# Patient Record
Sex: MALE | Race: WHITE | NOT HISPANIC OR LATINO | Employment: FULL TIME | URBAN - METROPOLITAN AREA
[De-identification: names, ages, dates, MRNs, and addresses within clinical notes are randomized per-mention and may not be internally consistent; named-entity substitution may affect disease eponyms.]

---

## 2017-01-04 ENCOUNTER — HOSPITAL ENCOUNTER (EMERGENCY)
Facility: CLINIC | Age: 47
Discharge: HOME | End: 2017-01-04
Attending: EMERGENCY MEDICINE | Admitting: EMERGENCY MEDICINE
Payer: COMMERCIAL

## 2017-01-04 VITALS
HEIGHT: 72 IN | RESPIRATION RATE: 16 BRPM | TEMPERATURE: 98.6 F | BODY MASS INDEX: 32.78 KG/M2 | WEIGHT: 242 LBS | OXYGEN SATURATION: 97 % | DIASTOLIC BLOOD PRESSURE: 78 MMHG | SYSTOLIC BLOOD PRESSURE: 132 MMHG | HEART RATE: 82 BPM

## 2017-01-04 DIAGNOSIS — N20.0 KIDNEY STONE ON LEFT SIDE: ICD-10-CM

## 2017-01-04 LAB
ALBUMIN UR-MCNC: NEGATIVE MG/DL
ANION GAP SERPL CALCULATED.3IONS-SCNC: 9 MMOL/L (ref 3–14)
APPEARANCE UR: CLEAR
BASOPHILS # BLD AUTO: 0 10E9/L (ref 0–0.2)
BASOPHILS NFR BLD AUTO: 0.5 %
BILIRUB UR QL STRIP: NEGATIVE
BUN SERPL-MCNC: 16 MG/DL (ref 7–30)
CALCIUM SERPL-MCNC: 9.3 MG/DL (ref 8.5–10.1)
CHLORIDE SERPL-SCNC: 101 MMOL/L (ref 94–109)
CO2 SERPL-SCNC: 28 MMOL/L (ref 20–32)
COLOR UR AUTO: YELLOW
CREAT SERPL-MCNC: 1.19 MG/DL (ref 0.66–1.25)
DIFFERENTIAL METHOD BLD: NORMAL
EOSINOPHIL # BLD AUTO: 0.1 10E9/L (ref 0–0.7)
EOSINOPHIL NFR BLD AUTO: 1.2 %
ERYTHROCYTE [DISTWIDTH] IN BLOOD BY AUTOMATED COUNT: 12.1 % (ref 10–15)
GFR SERPL CREATININE-BSD FRML MDRD: 65 ML/MIN/1.7M2
GLUCOSE SERPL-MCNC: 108 MG/DL (ref 70–99)
GLUCOSE UR STRIP-MCNC: NEGATIVE MG/DL
HCT VFR BLD AUTO: 40.6 % (ref 40–53)
HGB BLD-MCNC: 14.3 G/DL (ref 13.3–17.7)
HGB UR QL STRIP: NEGATIVE
IMM GRANULOCYTES # BLD: 0 10E9/L (ref 0–0.4)
IMM GRANULOCYTES NFR BLD: 0.5 %
KETONES UR STRIP-MCNC: NEGATIVE MG/DL
LEUKOCYTE ESTERASE UR QL STRIP: NEGATIVE
LYMPHOCYTES # BLD AUTO: 2 10E9/L (ref 0.8–5.3)
LYMPHOCYTES NFR BLD AUTO: 23.4 %
MCH RBC QN AUTO: 30.3 PG (ref 26.5–33)
MCHC RBC AUTO-ENTMCNC: 35.2 G/DL (ref 31.5–36.5)
MCV RBC AUTO: 86 FL (ref 78–100)
MONOCYTES # BLD AUTO: 0.7 10E9/L (ref 0–1.3)
MONOCYTES NFR BLD AUTO: 8.1 %
MUCOUS THREADS #/AREA URNS LPF: PRESENT /LPF
NEUTROPHILS # BLD AUTO: 5.8 10E9/L (ref 1.6–8.3)
NEUTROPHILS NFR BLD AUTO: 66.3 %
NITRATE UR QL: NEGATIVE
NRBC # BLD AUTO: 0 10*3/UL
NRBC BLD AUTO-RTO: 0 /100
PH UR STRIP: 5.5 PH (ref 5–7)
PLATELET # BLD AUTO: 168 10E9/L (ref 150–450)
POTASSIUM SERPL-SCNC: 4 MMOL/L (ref 3.4–5.3)
RBC # BLD AUTO: 4.72 10E12/L (ref 4.4–5.9)
RBC #/AREA URNS AUTO: 9 /HPF (ref 0–2)
SODIUM SERPL-SCNC: 138 MMOL/L (ref 133–144)
SP GR UR STRIP: 1.01 (ref 1–1.03)
TRANS CELLS #/AREA URNS HPF: <1 /HPF (ref 0–1)
URN SPEC COLLECT METH UR: ABNORMAL
UROBILINOGEN UR STRIP-MCNC: NORMAL MG/DL (ref 0–2)
WBC # BLD AUTO: 8.7 10E9/L (ref 4–11)
WBC #/AREA URNS AUTO: 2 /HPF (ref 0–2)

## 2017-01-04 PROCEDURE — 99283 EMERGENCY DEPT VISIT LOW MDM: CPT

## 2017-01-04 PROCEDURE — 36415 COLL VENOUS BLD VENIPUNCTURE: CPT | Performed by: EMERGENCY MEDICINE

## 2017-01-04 PROCEDURE — 80048 BASIC METABOLIC PNL TOTAL CA: CPT | Performed by: EMERGENCY MEDICINE

## 2017-01-04 PROCEDURE — 85025 COMPLETE CBC W/AUTO DIFF WBC: CPT | Performed by: EMERGENCY MEDICINE

## 2017-01-04 PROCEDURE — 25000132 ZZH RX MED GY IP 250 OP 250 PS 637: Performed by: EMERGENCY MEDICINE

## 2017-01-04 PROCEDURE — 81001 URINALYSIS AUTO W/SCOPE: CPT | Performed by: EMERGENCY MEDICINE

## 2017-01-04 RX ORDER — ONDANSETRON 4 MG/1
4 TABLET, ORALLY DISINTEGRATING ORAL EVERY 8 HOURS PRN
Qty: 10 TABLET | Refills: 0 | Status: SHIPPED | OUTPATIENT
Start: 2017-01-04 | End: 2017-01-07

## 2017-01-04 RX ORDER — OXYCODONE AND ACETAMINOPHEN 5; 325 MG/1; MG/1
1 TABLET ORAL ONCE
Status: COMPLETED | OUTPATIENT
Start: 2017-01-04 | End: 2017-01-04

## 2017-01-04 RX ORDER — TAMSULOSIN HYDROCHLORIDE 0.4 MG/1
0.4 CAPSULE ORAL DAILY
Qty: 7 CAPSULE | Refills: 0 | Status: SHIPPED | OUTPATIENT
Start: 2017-01-04 | End: 2017-01-11

## 2017-01-04 RX ORDER — OXYCODONE AND ACETAMINOPHEN 5; 325 MG/1; MG/1
2 TABLET ORAL ONCE
Status: COMPLETED | OUTPATIENT
Start: 2017-01-04 | End: 2017-01-04

## 2017-01-04 RX ORDER — OXYCODONE AND ACETAMINOPHEN 5; 325 MG/1; MG/1
1 TABLET ORAL EVERY 6 HOURS PRN
Qty: 15 TABLET | Refills: 0 | Status: SHIPPED | OUTPATIENT
Start: 2017-01-04 | End: 2017-09-08

## 2017-01-04 RX ORDER — HYDROCODONE BITARTRATE AND ACETAMINOPHEN 5; 325 MG/1; MG/1
2 TABLET ORAL ONCE
Status: DISCONTINUED | OUTPATIENT
Start: 2017-01-04 | End: 2017-01-04

## 2017-01-04 RX ORDER — TAMSULOSIN HYDROCHLORIDE 0.4 MG/1
0.4 CAPSULE ORAL ONCE
Status: COMPLETED | OUTPATIENT
Start: 2017-01-04 | End: 2017-01-04

## 2017-01-04 RX ADMIN — OXYCODONE HYDROCHLORIDE AND ACETAMINOPHEN 1 TABLET: 5; 325 TABLET ORAL at 11:32

## 2017-01-04 RX ADMIN — OXYCODONE HYDROCHLORIDE AND ACETAMINOPHEN 1 TABLET: 5; 325 TABLET ORAL at 14:35

## 2017-01-04 RX ADMIN — TAMSULOSIN HYDROCHLORIDE 0.4 MG: 0.4 CAPSULE ORAL at 14:35

## 2017-01-04 ASSESSMENT — ENCOUNTER SYMPTOMS
FEVER: 0
VOMITING: 0
FLANK PAIN: 1
NAUSEA: 1
HEMATURIA: 0

## 2017-01-04 NOTE — ED NOTES
Patient began experiencing left sided flank pain that is similar to previous kidney stones this morning.      ABCs intact.  Alert and oriented x 3.

## 2017-01-04 NOTE — DISCHARGE INSTRUCTIONS
Treating Kidney Stones: Expectant Therapy  Most kidney stones are about the size of a grape seed. Stones of this size are small enough to pass naturally. Once it is passed, a stone can be analyzed. This  wait and see  approach is called expectant therapy. Small stones can often be passed with expectant therapy. If pain is a problem, ask your doctor about pain medications. Then follow his or her directions on how much water to drink. Drinking more water creates more urine to  flush out  your stone. Also be sure to strain your urine. Take any stones you pass to your doctor for analysis.    Drink lots of water  Drinking lots of water may help your stone pass. Water also dilutes the chemicals in your urine. This reduces your risk of forming new stones. You may be told to drink 8, 12-ounce glasses of water a day. Avoid liquids that dehydrate you, such as those containing caffeine or alcohol.  Strain your urine  Straining your urine lets you collect your stone for analysis. Use the strainer each time you urinate. Strain your urine for as long as your doctor suggests. Watch for brown, tan, gold, or black specks or tiny silvana. These may be kidney stones.  Take your medicine  Your doctor may give you medicine that makes it more likely for you to pass the kidney stone.   Follow up with your doctor  Follow up by taking any stones you find to your doctor for analysis. The type of stone you have determines your diet and prevention program. You may need more tests in the future. These tests will ensure that new stones are not forming.    5810-2923 [employer]. [address] [place] [address], [place] [address]. All rights reserved. This information is not intended as a substitute for professional medical care. Always follow your healthcare professional's instructions.

## 2017-01-04 NOTE — ED PROVIDER NOTES
History     Chief Complaint:  Kidney Problem    HPI   Jak Villalpando is a 46 year old male with history of kidney stones s/p lithotripsy who presents to the emergency department today for evaluation of sudden onset left sided flank pain that began this morning. He also reports associated nausea secondary to pain, and characterizes pain that waxes and wanes; Pain is also aggravated with movement. Percocet administered in triage has improved pain somewhat, but patient also notes that pain has been generally improving since onset. Patient states that he is confident that his symptoms are a result of another kidney stone. He denies blood in urine but, with his last stone, he did not have blood in urine. Patient also reports no fever and vomiting. Of note, he agrees that he perhaps passed the stone. The patient follows with Dr. Reyez. No other concerns were voiced at this time.     Allergies:  Ibuprofen: hives     Medications:     The patient is currently on no regular medications.    Past Medical History:     Renal disease  Carpal tunnel  Compression fracture of thoracic vertebra    Past Surgical History:     Herniorrhaphy inguinal  Shock wave lithotripsy  Cytoscopy flexible    Family History:     Mother: eye pressure  Father: brain tumor    Social History:  Marital status:   Former smoker, positive for alcohol use.  The patient presents alone.  Review of Systems   Constitutional: Negative for fever.   Gastrointestinal: Positive for nausea. Negative for vomiting.   Genitourinary: Positive for flank pain (flank). Negative for hematuria.   All other systems reviewed and are negative.    Physical Exam   First Vitals:  BP: 142/79 mmHg  Pulse: 99  Temp: 98.6  F (37  C)  Resp: 14  Height: 182.9 cm (6')  Weight: 109.77 kg (242 lb)  SpO2: 98 %    Physical Exam  Nursing note and vitals reviewed.  Constitutional: Cooperative.   HENT:   Mouth/Throat: Moist mucous membranes.   Eyes: EOMI, nonicteric  sclera  Cardiovascular: Normal rate, regular rhythm, no murmurs, rubs, or gallops  Pulmonary/Chest: Effort normal and breath sounds normal. No respiratory distress. No wheezes. No rales.   Abdominal: Soft. Nontender, nondistended, no guarding or rigidity. BS present. No CVA tenderness  : No hernias. No testicular pain.   Musculoskeletal: Normal range of motion.   Neurological: Alert. Moves all extremities spontaneously.   Skin: Skin is warm and dry. No rash noted.   Psychiatric: Normal mood and affect.     Emergency Department Course     Laboratory:  Laboratory findings were communicated with the patient who voiced understanding of the findings.  CBC: AWNL. (WBC 8.7, HGB 14.3, )   BMP: Glucose: 108(H), Creatinine 1.19  UA: RBC: 9(H), Mucous Urine: Present(A)    Interventions:  1132 Percocet 2 tablet Oral    1435 Percocet 1 tablet Oral   1435 Flomax 0.4 mg Oral      Emergency Department Course:  Nursing notes and vitals reviewed.  I performed an exam of the patient as documented above.   The patient provided a urine sample here in the emergency department. This was sent for laboratory testing, findings above.  IV was inserted and blood was drawn for laboratory testing, results above.    At 1626 the patient was rechecked and states that his pain is somewhat better. He was updated on lab results with updated plan of care.     I discussed the treatment plan with the patient. They expressed understanding of this plan and consented to discharge. They will be discharged home with instructions for care and follow up. In addition, the patient will return to the emergency department if their symptoms persist, worsen, if new symptoms arise or if there is any concern.  All questions were answered.    I personally reviewed the laboratory results with the Patient and answered all related questions prior to discharge.    Impression & Plan      Medical Decision Making:  Jak Villalpando is a 46 year old male who presents  with flank pain. A broad differential diagnosis was considered including diverticulitis, ureterolithiasis, tumor, colitis, cholecystitis, aneurysm, dissection, volvulus, appendicitis, splenic issue, stomach pathology, ulcer, hydronephrosis, pneumonia, rib fracture, UTI, pyelonephritis amongst many other etiologies. Signs and symptoms consistent with ureterolithiasis.  Patients pain is controlled in ED and given flomax.  No signs of infected stone.  Patient is hemodynamically stable in ED. Plan is home with abdominal pain recheck by primary care physician or return to ED if symptoms worsen.  Return for fevers greater than 102, increasing pain, other new symptoms develop.  Ureterolithiasis precautions for home.  Questions were answered. Will forgo imaging at this time given hx of stones, previous imaging and classic story.  Patient understands therefore that we cannot know how big stone is nor probability of passage.     Diagnosis:    ICD-10-CM    1. Kidney stone on left side N20.0     Suspected, not confirmed     Disposition:   Discharge home; plan for follow up with Heide Maciel    Discharge Medications:  New Prescriptions    ONDANSETRON (ZOFRAN ODT) 4 MG ODT TAB    Take 1 tablet (4 mg) by mouth every 8 hours as needed for nausea    OXYCODONE-ACETAMINOPHEN (PERCOCET) 5-325 MG PER TABLET    Take 1 tablet by mouth every 6 hours as needed for pain    TAMSULOSIN (FLOMAX) 0.4 MG CAPSULE    Take 1 capsule (0.4 mg) by mouth daily for 7 days     Scribe Disclosure:  Leonardo WOOD, am serving as a scribe at 2:21 PM on 1/4/2017 to document services personally performed by Yony Jensen MD, based on my observations and the provider's statements to me.  [facility] EMERGENCY DEPARTMENT        Yony Jensen MD  01/09/17 [id number]

## 2017-01-04 NOTE — ED AVS SNAPSHOT
[facility] Emergency Department    [address]    [facility] [address]    Phone:  [phone]    Fax:  [phone]                                       Jak Villalpando   MRN: [id number]    Department:  [facility] Emergency Department   Date of Visit:  1/4/2017           Patient Information     Date Of Birth          1970        Your diagnoses for this visit were:     Kidney stone on left side Suspected, not confirmed       You were seen by Yony Jensen MD.      Follow-up Information     Follow up with Bharath Reyez MD.    Specialty:  Urology    Why:  Call for follow-up appt for early next week.     Contact information:    [facility] UROLOGY  [address]  [facility] [address] [phone]          Follow up with [facility] Emergency Department.    Specialty:  EMERGENCY MEDICINE    Why:  As needed, If symptoms worsen    Contact information:    [address] [facility] [address] [phone]        Discharge Instructions         Treating Kidney Stones: Expectant Therapy  Most kidney stones are about the size of a grape seed. Stones of this size are small enough to pass naturally. Once it is passed, a stone can be analyzed. This  wait and see  approach is called expectant therapy. Small stones can often be passed with expectant therapy. If pain is a problem, ask your doctor about pain medications. Then follow his or her directions on how much water to drink. Drinking more water creates more urine to  flush out  your stone. Also be sure to strain your urine. Take any stones you pass to your doctor for analysis.    Drink lots of water  Drinking lots of water may help your stone pass. Water also dilutes the chemicals in your urine. This reduces your risk of forming new stones. You may be told to drink 8, 12-ounce glasses of water a day. Avoid liquids that dehydrate you, such as those containing caffeine or  alcohol.  Strain your urine  Straining your urine lets you collect your stone for analysis. Use the strainer each time you urinate. Strain your urine for as long as your doctor suggests. Watch for brown, tan, gold, or black specks or tiny silvana. These may be kidney stones.  Take your medicine  Your doctor may give you medicine that makes it more likely for you to pass the kidney stone.   Follow up with your doctor  Follow up by taking any stones you find to your doctor for analysis. The type of stone you have determines your diet and prevention program. You may need more tests in the future. These tests will ensure that new stones are not forming.    0252-6578 [employer]. [address] [place] [address], [place] [address]. All rights reserved. This information is not intended as a substitute for professional medical care. Always follow your healthcare professional's instructions.          24 Hour Appointment Hotline       To make an appointment at any [facility], call [phone] ([phone]). If you don't have a family doctor or clinic, we will help you find one. [facility] clinics are conveniently located to serve the needs of you and your family.             Review of your medicines      START taking        Dose / Directions Last dose taken    ondansetron 4 MG ODT tab   Commonly known as:  ZOFRAN ODT   Dose:  4 mg   Quantity:  10 tablet        Take 1 tablet (4 mg) by mouth every 8 hours as needed for nausea   Refills:  0        tamsulosin 0.4 MG capsule   Commonly known as:  FLOMAX   Dose:  0.4 mg   Quantity:  7 capsule        Take 1 capsule (0.4 mg) by mouth daily for 7 days   Refills:  0          CONTINUE these medicines which may have CHANGED, or have new prescriptions. If we are uncertain of the size of tablets/capsules you have at home, strength may be listed as something that might have changed.        Dose / Directions Last dose taken    * oxyCODONE-acetaminophen 5-325 MG per tablet    Commonly known as:  PERCOCET   Dose:  1 tablet   What changed:  Another medication with the same name was added. Make sure you understand how and when to take each.   Quantity:  15 tablet        Take 1 tablet by mouth every 6 hours as needed for moderate to severe pain   Refills:  0        * oxyCODONE-acetaminophen 5-325 MG per tablet   Commonly known as:  PERCOCET   Dose:  1 tablet   What changed:  You were already taking a medication with the same name, and this prescription was added. Make sure you understand how and when to take each.   Quantity:  15 tablet        Take 1 tablet by mouth every 6 hours as needed for pain   Refills:  0        * Notice:  This list has 2 medication(s) that are the same as other medications prescribed for you. Read the directions carefully, and ask your doctor or other care provider to review them with you.      Our records show that you are taking the medicines listed below. If these are incorrect, please call your family doctor or clinic.        Dose / Directions Last dose taken    ondansetron 4 MG tablet   Commonly known as:  ZOFRAN   Dose:  4 mg   Quantity:  8 tablet        Take 1 tablet (4 mg) by mouth every 6 hours   Refills:  0                Prescriptions were sent or printed at these locations (3 Prescriptions)                   Other Prescriptions                Printed at Department/Unit printer (3 of 3)         tamsulosin (FLOMAX) 0.4 MG capsule               oxyCODONE-acetaminophen (PERCOCET) 5-325 MG per tablet               ondansetron (ZOFRAN ODT) 4 MG ODT tab                Procedures and tests performed during your visit     Basic metabolic panel    CBC with platelets differential    UA with Microscopic reflex to Culture      Orders Needing Specimen Collection     None      Pending Results     No orders found from 1/3/2017 to 1/5/2017.            Pending Culture Results     No orders found from 1/3/2017 to 1/5/2017.       Test Results from your hospital stay            1/4/2017  3:14 PM - Interface, Flexilab Results      Component Results     Component Value Ref Range & Units Status    WBC 8.7 4.0 - 11.0 10e9/L Final    RBC Count 4.72 4.4 - 5.9 10e12/L Final    Hemoglobin 14.3 13.3 - 17.7 g/dL Final    Hematocrit 40.6 40.0 - 53.0 % Final    MCV 86 78 - 100 fl Final    MCH 30.3 26.5 - 33.0 pg Final    MCHC 35.2 31.5 - 36.5 g/dL Final    RDW 12.1 10.0 - 15.0 % Final    Platelet Count 168 150 - 450 10e9/L Final    Diff Method Automated Method  Final    % Neutrophils 66.3 % Final    % Lymphocytes 23.4 % Final    % Monocytes 8.1 % Final    % Eosinophils 1.2 % Final    % Basophils 0.5 % Final    % Immature Granulocytes 0.5 % Final    Nucleated RBCs 0 0 /100 Final    Absolute Neutrophil 5.8 1.6 - 8.3 10e9/L Final    Absolute Lymphocytes 2.0 0.8 - 5.3 10e9/L Final    Absolute Monocytes 0.7 0.0 - 1.3 10e9/L Final    Absolute Eosinophils 0.1 0.0 - 0.7 10e9/L Final    Absolute Basophils 0.0 0.0 - 0.2 10e9/L Final    Abs Immature Granulocytes 0.0 0 - 0.4 10e9/L Final    Absolute Nucleated RBC 0.0  Final         1/4/2017  3:30 PM - Interface, Flexilab Results      Component Results     Component Value Ref Range & Units Status    Sodium 138 133 - 144 mmol/L Final    Potassium 4.0 3.4 - 5.3 mmol/L Final    Chloride 101 94 - 109 mmol/L Final    Carbon Dioxide 28 20 - 32 mmol/L Final    Anion Gap 9 3 - 14 mmol/L Final    Glucose 108 (H) 70 - 99 mg/dL Final    Urea Nitrogen 16 7 - 30 mg/dL Final    Creatinine 1.19 0.66 - 1.25 mg/dL Final    GFR Estimate 65 >60 mL/min/1.7m2 Final    Non  GFR Calc    GFR Estimate If Black 79 >60 mL/min/1.7m2 Final    African American GFR Calc    Calcium 9.3 8.5 - 10.1 mg/dL Final         1/4/2017  2:51 PM - Interface, Flexilab Results      Component Results     Component Value Ref Range & Units Status    Color Urine Yellow  Final    Appearance Urine Clear  Final    Glucose Urine Negative NEG mg/dL Final    Bilirubin Urine Negative NEG Final     Ketones Urine Negative NEG mg/dL Final    Specific Gravity Urine 1.010 1.003 - 1.035 Final    Blood Urine Negative NEG Final    pH Urine 5.5 5.0 - 7.0 pH Final    Protein Albumin Urine Negative NEG mg/dL Final    Urobilinogen mg/dL Normal 0.0 - 2.0 mg/dL Final    Nitrite Urine Negative NEG Final    Leukocyte Esterase Urine Negative NEG Final    Source Midstream Urine  Final    WBC Urine 2 0 - 2 /HPF Final    RBC Urine 9 (H) 0 - 2 /HPF Final    Transitional Epi <1 0 - 1 /HPF Final    Mucous Urine Present (A) NEG /LPF Final                Clinical Quality Measure: Blood Pressure Screening     Your blood pressure was checked while you were in the emergency department today. The last reading we obtained was  BP: 142/79 mmHg . Please read the guidelines below about what these numbers mean and what you should do about them.  If your systolic blood pressure (the top number) is less than 120 and your diastolic blood pressure (the bottom number) is less than 80, then your blood pressure is normal. There is nothing more that you need to do about it.  If your systolic blood pressure (the top number) is 120-139 or your diastolic blood pressure (the bottom number) is 80-89, your blood pressure may be higher than it should be. You should have your blood pressure rechecked within a year by a primary care provider.  If your systolic blood pressure (the top number) is 140 or greater or your diastolic blood pressure (the bottom number) is 90 or greater, you may have high blood pressure. High blood pressure is treatable, but if left untreated over time it can put you at risk for heart attack, stroke, or kidney failure. You should have your blood pressure rechecked by a primary care provider within the next 4 weeks.  If your provider in the emergency department today gave you specific instructions to follow-up with your doctor or provider even sooner than that, you should follow that instruction and not wait for up to 4 weeks for your  "follow-up visit.        Thank you for choosing [facility]       Thank you for choosing [facility] for your care. Our goal is always to provide you with excellent care. Hearing back from our patients is one way we can continue to improve our services. Please take a few minutes to complete the written survey that you may receive in the mail after you visit with us. Thank you!        [employer] Information     [employer] lets you send messages to your doctor, view your test results, renew your prescriptions, schedule appointments and more. To sign up, go to [email or web address] . Click on \"Log in\" on the left side of the screen, which will take you to the Welcome page. Then click on \"Sign up Now\" on the right side of the page.     You will be asked to enter the access code listed below, as well as some personal information. Please follow the directions to create your username and password.     Your access code is: [email or web address]  Expires: 2017  4:23 PM     Your access code will  in 90 days. If you need help or a new code, please call your [facility] clinic or [phone].        Care EveryWhere ID     This is your Care EveryWhere ID. This could be used by other organizations to access your [facility] medical records  [id number]        After Visit Summary       This is your record. Keep this with you and show to your community pharmacist(s) and doctor(s) at your next visit.                  "

## 2017-01-04 NOTE — ED AVS SNAPSHOT
[facility] Emergency Department    [address]    [facility] [address]    Phone:  [phone]    Fax:  [phone]                                       Jak Villalpando   MRN: [id number]    Department:  [facility] Emergency Department   Date of Visit:  1/4/2017           After Visit Summary Signature Page     I have received my discharge instructions, and my questions have been answered. I have discussed any challenges I see with this plan with the nurse or doctor.    ..........................................................................................................................................  Patient/Patient Representative Signature      ..........................................................................................................................................  Patient Representative Print Name and Relationship to Patient    ..................................................               ................................................  Date                                            Time    ..........................................................................................................................................  Reviewed by Signature/Title    ...................................................              ..............................................  Date                                                            Time

## 2017-01-08 ENCOUNTER — OFFICE VISIT (OUTPATIENT)
Dept: URGENT CARE | Facility: URGENT CARE | Age: 47
End: 2017-01-08
Payer: COMMERCIAL

## 2017-01-08 VITALS
WEIGHT: 240 LBS | TEMPERATURE: 98.1 F | HEIGHT: 72 IN | SYSTOLIC BLOOD PRESSURE: 124 MMHG | OXYGEN SATURATION: 97 % | DIASTOLIC BLOOD PRESSURE: 76 MMHG | BODY MASS INDEX: 32.51 KG/M2 | HEART RATE: 102 BPM

## 2017-01-08 DIAGNOSIS — R07.0 THROAT PAIN: Primary | ICD-10-CM

## 2017-01-08 LAB
DEPRECATED S PYO AG THROAT QL EIA: NORMAL
MICRO REPORT STATUS: NORMAL
SPECIMEN SOURCE: NORMAL

## 2017-01-08 PROCEDURE — 87081 CULTURE SCREEN ONLY: CPT | Performed by: FAMILY MEDICINE

## 2017-01-08 PROCEDURE — 87880 STREP A ASSAY W/OPTIC: CPT | Performed by: FAMILY MEDICINE

## 2017-01-08 PROCEDURE — 99213 OFFICE O/P EST LOW 20 MIN: CPT | Performed by: FAMILY MEDICINE

## 2017-01-08 RX ORDER — PREDNISONE 20 MG/1
40 TABLET ORAL DAILY
Qty: 10 TABLET | Refills: 0 | Status: SHIPPED | OUTPATIENT
Start: 2017-01-08 | End: 2017-01-13

## 2017-01-08 RX ORDER — OXYCODONE AND ACETAMINOPHEN 5; 325 MG/1; MG/1
1 TABLET ORAL EVERY 4 HOURS PRN
Qty: 18 TABLET | Refills: 0 | Status: SHIPPED | OUTPATIENT
Start: 2017-01-08 | End: 2017-09-08

## 2017-01-08 RX ORDER — AZITHROMYCIN 250 MG/1
TABLET, FILM COATED ORAL
Qty: 6 TABLET | Refills: 0 | Status: SHIPPED | OUTPATIENT
Start: 2017-01-08 | End: 2017-09-08

## 2017-01-08 NOTE — NURSING NOTE
Chief Complaint   Patient presents with     Urgent Care     Pharyngitis     c/o sore throat for 3 days       Initial /76 mmHg  Pulse 102  Temp(Src) 98.1  F (36.7  C) (Tympanic)  Ht 6' (1.829 m)  Wt 240 lb (108.863 kg)  BMI 32.54 kg/m2  SpO2 97% Estimated body mass index is 32.54 kg/(m^2) as calculated from the following:    Height as of this encounter: 6' (1.829 m).    Weight as of this encounter: 240 lb (108.863 kg).  BP completed using cuff size: priscila Gomez MA

## 2017-01-08 NOTE — PROGRESS NOTES
SUBJECTIVE: 46 year old male with sore throat, myalgias, swollen glands, headache and fever for several days. No history of rheumatic fever. Other symptoms: nasal blockage and runny nose.    OBJECTIVE:   Vitals as noted above.  Appears mild distress.  Ears: abnormal: R TM erythematous; L TM erythematous  Oropharynx: moderate erythema  Neck: supple and moderate nontender anterior cervical nodes  Lungs: chest clear to IPPA and clear to IPPA  Rapid Strep test is negative    ASSESSMENT: 1. Pharyngitis  2. Uri    Having significant discomfort in the back of his throat. There were no swollen glands in the posterior aspect of his neck that might suggest mono    PLAN: Per orders. Gargle, use acetaminophen or other OTC analgesic, and take Rx fully as prescribed. Call if other family members develop similar symptoms. See prn.

## 2017-01-10 LAB
BACTERIA SPEC CULT: NORMAL
MICRO REPORT STATUS: NORMAL
SPECIMEN SOURCE: NORMAL

## 2017-08-31 ENCOUNTER — HOSPITAL ENCOUNTER (OUTPATIENT)
Dept: ULTRASOUND IMAGING | Facility: CLINIC | Age: 47
Discharge: HOME | End: 2017-08-31
Attending: UROLOGY | Admitting: UROLOGY
Payer: COMMERCIAL

## 2017-08-31 ENCOUNTER — OFFICE VISIT (OUTPATIENT)
Dept: UROLOGY | Facility: CLINIC | Age: 47
End: 2017-08-31
Payer: COMMERCIAL

## 2017-08-31 ENCOUNTER — HOSPITAL ENCOUNTER (OUTPATIENT)
Dept: GENERAL RADIOLOGY | Facility: CLINIC | Age: 47
Discharge: HOME | End: 2017-08-31
Attending: UROLOGY | Admitting: UROLOGY
Payer: COMMERCIAL

## 2017-08-31 VITALS
HEIGHT: 72 IN | BODY MASS INDEX: 32.51 KG/M2 | SYSTOLIC BLOOD PRESSURE: 120 MMHG | HEART RATE: 78 BPM | WEIGHT: 240 LBS | DIASTOLIC BLOOD PRESSURE: 66 MMHG

## 2017-08-31 DIAGNOSIS — N20.0 KIDNEY STONE: ICD-10-CM

## 2017-08-31 DIAGNOSIS — N50.812 LEFT TESTICULAR PAIN: ICD-10-CM

## 2017-08-31 DIAGNOSIS — N20.0 KIDNEY STONE: Primary | ICD-10-CM

## 2017-08-31 LAB
ALBUMIN UR-MCNC: NEGATIVE MG/DL
APPEARANCE UR: CLEAR
BILIRUB UR QL STRIP: NEGATIVE
CALCIUM SERPL-MCNC: 10.2 MG/DL (ref 8.5–10.1)
COLOR UR AUTO: YELLOW
GLUCOSE UR STRIP-MCNC: NEGATIVE MG/DL
HGB UR QL STRIP: ABNORMAL
KETONES UR STRIP-MCNC: NEGATIVE MG/DL
LEUKOCYTE ESTERASE UR QL STRIP: NEGATIVE
NITRATE UR QL: NEGATIVE
PH UR STRIP: 7 PH (ref 5–7)
SOURCE: ABNORMAL
SP GR UR STRIP: 1.02 (ref 1–1.03)
UROBILINOGEN UR STRIP-ACNC: 0.2 EU/DL (ref 0.2–1)

## 2017-08-31 PROCEDURE — 36415 COLL VENOUS BLD VENIPUNCTURE: CPT | Performed by: UROLOGY

## 2017-08-31 PROCEDURE — 81003 URINALYSIS AUTO W/O SCOPE: CPT | Mod: QW | Performed by: UROLOGY

## 2017-08-31 PROCEDURE — 99213 OFFICE O/P EST LOW 20 MIN: CPT | Performed by: UROLOGY

## 2017-08-31 PROCEDURE — 82310 ASSAY OF CALCIUM: CPT | Performed by: UROLOGY

## 2017-08-31 PROCEDURE — 74010 XR KUB: CPT | Mod: 52

## 2017-08-31 ASSESSMENT — PAIN SCALES - GENERAL: PAINLEVEL: MODERATE PAIN (4)

## 2017-08-31 NOTE — LETTER
8/31/2017     RE: Jak Villalpando  [address] [facility] [address]     Dear Colleague,    Thank you for referring your patient, Jak Villalpando, to the [facility]OLOGY [facility] [place] [facility]. Please see a copy of my visit note below.    Jak Villalpando is a 47-year-old male with a history of calcium oxalate dihydrate/Carbonate apatite stones. He's been having intermittent and severe, sharp left testicular pain for the past several months. He has had no testicular swelling, dysuria, gross hematuria, fever, chills back or flank pain.  KUB this morning shows 2 stones in the left kidney with increasing size of one stone, 2 stones in the lower pole the right kidney has before. He did not repeat his serum calcium and he did his 24-hour urine yesterday in the wrong container.  Other past medical history: Migraine headaches, former smoker, chronic back pain-Vicodin does not help anymore  Medications: Has taken Percocet in the past year for pain  Allergies: Ibuprofen  Exam: Normal appearance, normal vital signs, alert and oriented, normocephalic, normal respirations, neuro grossly intact. Normal external genitalia including phallus, scrotum, both testicles, epididymis, spermatic cords, no inguinal adenopathy or hernias. Lower abdomen benign  Urinalysis: Small blood, pH 7.0, specific gravity 1.020  Assessment: Left testicular pain, history of stones, microhematuria probably due to stones  Plan: Testicular ultrasound this morning, repeat serum calcium. If calcium is high-we will evaluate for hyperparathyroidism. Repeat 24-hour urine. Percocet No. 30. May need CT scan in the future    Again, thank you for allowing me to participate in the care of your patient.      Sincerely,    Bharath Reyez MD

## 2017-08-31 NOTE — LETTER
Patient:  Jak Villalpando  :   1970  MRN:     [id number]        Mr.Sean Avtar Villalpando  [address] [facility] [address]        2017    Dear ,    We are writing to inform you of your test results. Testicle is normal. Your Serum Calcium is elevated again. You should follow up with your primary care physician to rule out hyperparathyroidism.    Nola Reyez MD          Resulted Orders   US Testicular & Scrotum w Doppler Ltd    [facility]    ULTRASOUND TESTICULAR AND SCROTUM WITH DOPPLER LIMITED  2017 1:27  PM     HISTORY: Left testicular pain.    FINDINGS: The testicles bilaterally are homogeneous in echotexture  without focal mass. The right testicle measures 4.6 x 2.3 x 3.2 cm.  The left testicle measures 4.7 x 2.3 x 3.3 cm. Color Doppler waveform  analysis demonstrates normal arterial waveforms within the testicles.  No evidence of testicular torsion. The right epididymis is normal. The  left epididymis is normal. No evidence of varicoceles or right  hydrocele. Left hydrocele is present.      Impression    IMPRESSION:    1. Normal testicles without torsion or mass. No evidence of  epididymitis.  2. Left hydrocele.    AJIT ARANDA MD                   [id number]  1970

## 2017-08-31 NOTE — NURSING NOTE
Has had intermittent L testicular pain for for past few months. Did take  Flomax the last few days thinking this may be stone related. Pain increases  With standing and lifting. Katerin Franz LPN

## 2017-08-31 NOTE — MR AVS SNAPSHOT
After Visit Summary   8/31/2017    Jak Villalpando    MRN: [id number]           Patient Information     Date Of Birth          1970        Visit Information        Provider Department      8/31/2017 8:00 AM Bharath Reyez MD [facility] [place]        Today's Diagnoses     Kidney stone    -  1    Left testicular pain           Follow-ups after your visit        Your next 10 appointments already scheduled     Aug 31, 2017  1:30 PM CDT   US TESTICULAR AND SCROTUM WITH DOPPLER LIMITED with RSCCUS2   [facility] ([facility])    [address]  [facility] [address] [phone]           Please bring a list of your medicines (including vitamins, minerals and over-the-counter drugs). Also, tell your doctor about any allergies you may have. Wear comfortable clothes and leave your valuables at home.  You do not need to do anything special to prepare for your exam.  Please call the Imaging Department at your exam site with any questions.              Future tests that were ordered for you today     Open Future Orders        Priority Expected Expires Ordered    US Testicular & Scrotum w Doppler Ltd Routine  8/31/2018 8/31/2017    XR KUB [[id number]] Routine 8/31/2017 8/31/2018 8/31/2017            Who to contact     If you have questions or need follow up information about today's clinic visit or your schedule please contact [facility] [place] directly at [phone].  Normal or non-critical lab and imaging results will be communicated to you by MyChart, letter or phone within 4 business days after the clinic has received the results. If you do not hear from us within 7 days, please contact the clinic through MyChart or phone. If you have a critical or abnormal lab result, we will notify you by phone as soon as possible.  Submit refill requests through Damballat or  "call your pharmacy and they will forward the refill request to us. Please allow 3 business days for your refill to be completed.          Additional Information About Your Visit        MyChart Information     Nubee lets you send messages to your doctor, view your test results, renew your prescriptions, schedule appointments and more. To sign up, go to [email or web address] . Click on \"Log in\" on the left side of the screen, which will take you to the Welcome page. Then click on \"Sign up Now\" on the right side of the page.     You will be asked to enter the access code listed below, as well as some personal information. Please follow the directions to create your username and password.     Your access code is: [email or web address]  Expires: 2017  9:00 AM     Your access code will  in 90 days. If you need help or a new code, please call your [place] clinic or [phone].        Care EveryWhere ID     This is your Care EveryWhere ID. This could be used by other organizations to access your [place] medical records  [id number]        Your Vitals Were     Pulse Height BMI (Body Mass Index)             78 1.829 m (6') 32.55 kg/m2          Blood Pressure from Last 3 Encounters:   17 120/66   17 124/76   17 132/78    Weight from Last 3 Encounters:   17 108.9 kg (240 lb)   17 108.9 kg (240 lb)   17 109.8 kg (242 lb)              We Performed the Following     UA without Microscopic        Primary Care Provider Office Phone # Fax #    Heide Maciel [phone]         [address]   [facility] [address]        Equal Access to Services     [facility] LEOBARDO AH: Whitney Car, guy solares, hugo coates, alyx rivera. So [facility] [phone].    ATENCIÓN: Si habla español, tiene a sellers disposición servicios gratuitos de asistencia lingüística. Llame al [phone].    We comply with applicable " federal civil rights laws and [place] laws. We do not discriminate on the basis of race, color, national origin, age, disability sex, sexual orientation or gender identity.            Thank you!     Thank you for choosing [facility] [place]  for your care. Our goal is always to provide you with excellent care. Hearing back from our patients is one way we can continue to improve our services. Please take a few minutes to complete the written survey that you may receive in the mail after your visit with us. Thank you!             Your Updated Medication List - Protect others around you: Learn how to safely use, store and throw away your medicines at www.disposemymeds.org.          This list is accurate as of: 8/31/17  9:00 AM.  Always use your most recent med list.                   Brand Name Dispense Instructions for use Diagnosis    azithromycin 250 MG tablet    ZITHROMAX    6 tablet    Two tablets first day, then one tablet daily for four days.    Throat pain       ondansetron 4 MG tablet    ZOFRAN    8 tablet    Take 1 tablet (4 mg) by mouth every 6 hours        * oxyCODONE-acetaminophen 5-325 MG per tablet    PERCOCET    15 tablet    Take 1 tablet by mouth every 6 hours as needed for moderate to severe pain        * oxyCODONE-acetaminophen 5-325 MG per tablet    PERCOCET    15 tablet    Take 1 tablet by mouth every 6 hours as needed for pain        * oxyCODONE-acetaminophen 5-325 MG per tablet    PERCOCET    18 tablet    Take 1 tablet by mouth every 4 hours as needed for pain maximum 3 tablet(s) per day    Throat pain       * Notice:  This list has 3 medication(s) that are the same as other medications prescribed for you. Read the directions carefully, and ask your doctor or other care provider to review them with you.

## 2017-08-31 NOTE — PROGRESS NOTES
Jak Villalpando is a 47-year-old male with a history of calcium oxalate dihydrate/Carbonate apatite stones. He's been having intermittent and severe, sharp left testicular pain for the past several months. He has had no testicular swelling, dysuria, gross hematuria, fever, chills back or flank pain.  KUB this morning shows 2 stones in the left kidney with increasing size of one stone, 2 stones in the lower pole the right kidney has before. He did not repeat his serum calcium and he did his 24-hour urine yesterday in the wrong container.  Other past medical history: Migraine headaches, former smoker, chronic back pain-Vicodin does not help anymore  Medications: Has taken Percocet in the past year for pain  Allergies: Ibuprofen  Exam: Normal appearance, normal vital signs, alert and oriented, normocephalic, normal respirations, neuro grossly intact. Normal external genitalia including phallus, scrotum, both testicles, epididymis, spermatic cords, no inguinal adenopathy or hernias. Lower abdomen benign  Urinalysis: Small blood, pH 7.0, specific gravity 1.020  Assessment: Left testicular pain, history of stones, microhematuria probably due to stones  Plan: Testicular ultrasound this morning, repeat serum calcium. If calcium is high-we will evaluate for hyperparathyroidism. Repeat 24-hour urine. Percocet No. 30. May need CT scan in the future

## 2017-09-01 NOTE — PROGRESS NOTES
Tried calling patient, unable to leave message due to full mailbox. Will try again later to reach patient. Denise Motta LPN

## 2017-09-08 ENCOUNTER — HOSPITAL ENCOUNTER (OUTPATIENT)
Dept: CT IMAGING | Facility: CLINIC | Age: 47
Discharge: HOME | End: 2017-09-08
Attending: PHYSICIAN ASSISTANT | Admitting: PHYSICIAN ASSISTANT
Payer: COMMERCIAL

## 2017-09-08 ENCOUNTER — OFFICE VISIT (OUTPATIENT)
Dept: FAMILY MEDICINE | Facility: CLINIC | Age: 47
End: 2017-09-08
Payer: COMMERCIAL

## 2017-09-08 VITALS
TEMPERATURE: 98.8 F | SYSTOLIC BLOOD PRESSURE: 110 MMHG | BODY MASS INDEX: 32.69 KG/M2 | DIASTOLIC BLOOD PRESSURE: 90 MMHG | RESPIRATION RATE: 20 BRPM | WEIGHT: 241 LBS | HEART RATE: 76 BPM

## 2017-09-08 DIAGNOSIS — N50.812 TESTICULAR PAIN, LEFT: ICD-10-CM

## 2017-09-08 DIAGNOSIS — N20.0 CALCULUS OF KIDNEY: ICD-10-CM

## 2017-09-08 DIAGNOSIS — N50.812 TESTICULAR PAIN, LEFT: Primary | ICD-10-CM

## 2017-09-08 PROCEDURE — 74176 CT ABD & PELVIS W/O CONTRAST: CPT

## 2017-09-08 PROCEDURE — 99214 OFFICE O/P EST MOD 30 MIN: CPT | Performed by: PHYSICIAN ASSISTANT

## 2017-09-08 RX ORDER — BETAMETHASONE DIPROPIONATE 0.5 MG/G
OINTMENT, AUGMENTED TOPICAL
Refills: 1 | COMMUNITY
Start: 2017-09-01 | End: 2017-12-22

## 2017-09-08 RX ORDER — OXYCODONE AND ACETAMINOPHEN 5; 325 MG/1; MG/1
1 TABLET ORAL EVERY 4 HOURS PRN
Qty: 20 TABLET | Refills: 0 | Status: ON HOLD | OUTPATIENT
Start: 2017-09-08 | End: 2017-09-20

## 2017-09-08 NOTE — Clinical Note
Vitor Reyez- I saw Jak last week as I think he could not get in to see you.  Not sure if the stones on most recent scan are causing his current pain.  I am sending this note along for your review as I told him I would so you can review.  Hopefully you can help him sort out his pain.  I think he is simply frustrated about needing to miss work at this point.  Thanks, Bladimir Rushing PA-C

## 2017-09-08 NOTE — MR AVS SNAPSHOT
After Visit Summary   9/8/2017    Jak Villalpando    MRN: [id number]           Patient Information     Date Of Birth          1970        Visit Information        Provider Department      9/8/2017 9:40 AM Bladimir Rushing PA-C [facility]        Today's Diagnoses     Testicular pain, left    -  1       Follow-ups after your visit        Your next 10 appointments already scheduled     Sep 08, 2017 11:30 AM CDT   CT ABDOMEN PELVIS W/O CONTRAST with RHCT2   [facility] ([facility])    [address]  [facility] [address]   [phone]           Please bring any scans or X-rays taken at other hospitals, if similar tests were done. Also bring a list of your medicines, including vitamins, minerals and over-the-counter drugs. It is safest to leave personal items at home.  Be sure to tell your doctor:   If you have any allergies.   If there s any chance you are pregnant.   If you are breastfeeding.   If you have any special needs.  You may have contrast for this exam. To prepare:   Do not eat or drink for 2 hours before your exam. If you need to take medicine, you may take it with small sips of water. (We may ask you to take liquid medicine as well.)   The day before your exam, drink extra fluids at least six 8-ounce glasses (unless your doctor tells you to restrict your fluids).  Patients over 70 or patients with diabetes or kidney problems:   If you haven t had a blood test (creatinine test) within the last 30 days, go to your clinic or Diagnostic Imaging Department for this test.  If you have diabetes:   If your kidney function is normal, continue taking your metformin (Avandamet, Glucophage, Glucovance, Metaglip) on the day of your exam.   If your kidney function is abnormal, wait 48 hours before restarting this medicine.  You will have oral contrast for this exam:   You will drink the contrast at home. Get this from your clinic or  "Diagnostic Imaging Department. Please follow the directions given.  Please wear loose clothing, such as a sweat suit or jogging clothes. Avoid snaps, zippers and other metal. We may ask you to undress and put on a hospital gown.  If you have any questions, please call the Imaging Department where you will have your exam.              Future tests that were ordered for you today     Open Future Orders        Priority Expected Expires Ordered    CT Abdomen Pelvis w/o Contrast Routine  2018            Who to contact     If you have questions or need follow up information about today's clinic visit or your schedule please contact [facility] directly at [phone].  Normal or non-critical lab and imaging results will be communicated to you by DirectPointehart, letter or phone within 4 business days after the clinic has received the results. If you do not hear from us within 7 days, please contact the clinic through DirectPointehart or phone. If you have a critical or abnormal lab result, we will notify you by phone as soon as possible.  Submit refill requests through Aeropost or call your pharmacy and they will forward the refill request to us. Please allow 3 business days for your refill to be completed.          Additional Information About Your Visit        DirectPointehart Information     Aeropost lets you send messages to your doctor, view your test results, renew your prescriptions, schedule appointments and more. To sign up, go to [email or web address] . Click on \"Log in\" on the left side of the screen, which will take you to the Welcome page. Then click on \"Sign up Now\" on the right side of the page.     You will be asked to enter the access code listed below, as well as some personal information. Please follow the directions to create your username and password.     Your access code is: [email or web address]  Expires: 2017  9:00 AM     Your access code will  in 90 days. If you need help or a new code, " please call your [place] clinic or [phone].        Care EveryWhere ID     This is your Care EveryWhere ID. This could be used by other organizations to access your [place] medical records  [id number]        Your Vitals Were     Pulse Temperature Respirations BMI (Body Mass Index)          76 98.8  F (37.1  C) (Oral) 20 32.69 kg/m2         Blood Pressure from Last 3 Encounters:   09/08/17 110/90   08/31/17 120/66   01/08/17 124/76    Weight from Last 3 Encounters:   09/08/17 241 lb (109.3 kg)   08/31/17 240 lb (108.9 kg)   01/08/17 240 lb (108.9 kg)               Primary Care Provider Office Phone # Fax #    Heide Fiordaliza Maciel [phone]  [address]   [facility] [address]        Equal Access to Services     YELENA BOOTH : Hadii aad ku hadasho Soomaali, waaxda luqadaha, qaybta kaalmada adeegyada, waxay idiin hayramonan marion nolasco . So [facility] [phone].    ATENCIÓN: Si habla español, tiene a sellers disposición servicios gratuitos de asistencia lingüística. Cortezame al [phone].    We comply with applicable federal civil rights laws and [place] laws. We do not discriminate on the basis of race, color, national origin, age, disability sex, sexual orientation or gender identity.            Thank you!     Thank you for choosing [facility]  for your care. Our goal is always to provide you with excellent care. Hearing back from our patients is one way we can continue to improve our services. Please take a few minutes to complete the written survey that you may receive in the mail after your visit with us. Thank you!             Your Updated Medication List - Protect others around you: Learn how to safely use, store and throw away your medicines at www.disposemymeds.org.          This list is accurate as of: 9/8/17 10:47 AM.  Always use your most recent med list.                   Brand Name Dispense Instructions for use Diagnosis    augmented  betamethasone dipropionate 0.05 % ointment    DIPROLENE-AF     APPLY TOPICALLY TO RASH TWICE DAILY

## 2017-09-08 NOTE — PROGRESS NOTES
SUBJECTIVE:   Jak Villalpando is a 47 year old male who presents to clinic today for the following health issues:      TESTICULAR/SCROTAL PAIN      Duration: ongoing    Description (location/character/radiation): left testicular    Intensity:  10/10 with standing    Accompanying signs and symptoms: nausea, vomiting, hematuria, fever for 3 days with no other symptoms 2 weeks ago, body aches, diarrhea,     History (similar episodes/previous evaluation): yes chart    Precipitating or alleviating factors: history of kidney stones    Therapies tried and outcome: seen urology, lithotripsy 2014, drank vinegar      Patient here due to ongoing pain that is severe enough that he is now missing days at work. Has a history of kidney stones and has been seeing urology for this over the years and then also within the last few weeks.  This time it seems like perhaps his pain may not be related to stones?  Underwent a testicular US recently that showed a left hydrocele but he has not discussed this result yet with Dr. Reyez.  Had a serum calcium test that was at 10.2 with a high range of 10.1.  He is concerned because he has been missing work.  This Tuesday he had gross hematuria and vomited.  Pain is better when sitting but quite severe when happening.  Two Percocet tabs does not take care of the pain for him.  Has tried Flomax but this also is not helpful.      Problem list and histories reviewed & adjusted, as indicated.  Additional history: as documented    Reviewed and updated as needed this visit by clinical staffTobacco  Allergies  Problems  Med Hx  Surg Hx  Fam Hx  Soc Hx      Reviewed and updated as needed this visit by Provider         ROS:  Constitutional, HEENT, cardiovascular, pulmonary, gi and gu systems are negative, except as otherwise noted.      OBJECTIVE:   /90 (BP Location: Right arm, Patient Position: Sitting, Cuff Size: Adult Large)  Pulse 76  Temp 98.8  F (37.1  C) (Oral)  Resp 20  Wt 241  lb (109.3 kg)  BMI 32.69 kg/m2  Body mass index is 32.69 kg/(m^2).  GENERAL: healthy, alert and no distress  ABDOMEN: soft, nontender, no hepatosplenomegaly, no masses and bowel sounds normal  MS: no gross musculoskeletal defects noted, no edema  SKIN: no suspicious lesions or rashes  BACK: no CVA tenderness, no paralumbar tenderness  PSYCH: mentation appears normal, affect normal/bright    Diagnostic Test Results:    ASSESSMENT/PLAN:   1. Testicular pain, left  Scan was significant for stones within the lower pole of the left kidney.  Not sure if this could cause pain radiating significantly to the left testicle.  I did speak with him on the phone and I will send his chart to Dr. Reyez.  Recommend follow up with Urology regarding this issue.  Percocet Rx given for pain control.  - CT Abdomen Pelvis w/o Contrast; Future    2. Calculus of kidney (RENAL)  - see above.          Bladimir Rushing PA-C  [facility]

## 2017-09-08 NOTE — NURSING NOTE
Chief Complaint   Patient presents with     Testicular/scrotal Pain       Initial /90 (BP Location: Right arm, Patient Position: Sitting, Cuff Size: Adult Large)  Pulse 76  Temp 98.8  F (37.1  C) (Oral)  Resp 20  Wt 241 lb (109.3 kg)  BMI 32.69 kg/m2 Estimated body mass index is 32.69 kg/(m^2) as calculated from the following:    Height as of 8/31/17: 6' (1.829 m).    Weight as of this encounter: 241 lb (109.3 kg).  Medication Reconciliation: complete   Luz Lazcano MA

## 2017-09-19 ENCOUNTER — TELEPHONE (OUTPATIENT)
Dept: UROLOGY | Facility: CLINIC | Age: 47
End: 2017-09-19

## 2017-09-19 DIAGNOSIS — N20.0 CALCULUS OF KIDNEY: Primary | ICD-10-CM

## 2017-09-19 DIAGNOSIS — N20.0 KIDNEY STONE: Primary | ICD-10-CM

## 2017-09-19 DIAGNOSIS — E83.52 SERUM CALCIUM ELEVATED: ICD-10-CM

## 2017-09-19 NOTE — TELEPHONE ENCOUNTER
Pt having excruciating pain in left testicle. He has had an US which shows a hydrocele in left testi. Also, he had a CT Scan which shows multiple stones in each kidney. He would like a treatment plan. How do you want to proceed?

## 2017-09-20 ENCOUNTER — HOSPITAL ENCOUNTER (OUTPATIENT)
Facility: CLINIC | Age: 47
Setting detail: OBSERVATION
Discharge: HOME | End: 2017-09-21
Attending: UROLOGY | Admitting: UROLOGY
Payer: COMMERCIAL

## 2017-09-20 ENCOUNTER — APPOINTMENT (OUTPATIENT)
Dept: GENERAL RADIOLOGY | Facility: CLINIC | Age: 47
End: 2017-09-20
Attending: UROLOGY
Payer: COMMERCIAL

## 2017-09-20 ENCOUNTER — ANESTHESIA EVENT (OUTPATIENT)
Dept: SURGERY | Facility: CLINIC | Age: 47
End: 2017-09-20
Payer: COMMERCIAL

## 2017-09-20 ENCOUNTER — ANESTHESIA (OUTPATIENT)
Dept: SURGERY | Facility: CLINIC | Age: 47
End: 2017-09-20
Payer: COMMERCIAL

## 2017-09-20 DIAGNOSIS — N20.0 CALCULUS OF KIDNEY: ICD-10-CM

## 2017-09-20 PROBLEM — G89.18 POSTOPERATIVE PAIN: Status: ACTIVE | Noted: 2017-09-20

## 2017-09-20 PROCEDURE — G0378 HOSPITAL OBSERVATION PER HR: HCPCS

## 2017-09-20 PROCEDURE — 40000170 ZZH STATISTIC PRE-PROCEDURE ASSESSMENT II: Performed by: UROLOGY

## 2017-09-20 PROCEDURE — C1758 CATHETER, URETERAL: HCPCS | Performed by: UROLOGY

## 2017-09-20 PROCEDURE — 71000013 ZZH RECOVERY PHASE 1 LEVEL 1 EA ADDTL HR: Performed by: UROLOGY

## 2017-09-20 PROCEDURE — 40000648 ZZH STATISTIC LITHOTRIPSY IDENTIFIER: Performed by: UROLOGY

## 2017-09-20 PROCEDURE — 25000128 H RX IP 250 OP 636: Performed by: NURSE ANESTHETIST, CERTIFIED REGISTERED

## 2017-09-20 PROCEDURE — 25000132 ZZH RX MED GY IP 250 OP 250 PS 637: Performed by: UROLOGY

## 2017-09-20 PROCEDURE — 36000063 ZZH SURGERY LEVEL 4 EA 15 ADDTL MIN: Performed by: UROLOGY

## 2017-09-20 PROCEDURE — 25000128 H RX IP 250 OP 636: Performed by: ANESTHESIOLOGY

## 2017-09-20 PROCEDURE — 27210794 ZZH OR GENERAL SUPPLY STERILE: Performed by: UROLOGY

## 2017-09-20 PROCEDURE — 25000128 H RX IP 250 OP 636: Performed by: UROLOGY

## 2017-09-20 PROCEDURE — 37000009 ZZH ANESTHESIA TECHNICAL FEE, EACH ADDTL 15 MIN: Performed by: UROLOGY

## 2017-09-20 PROCEDURE — 25000125 ZZHC RX 250: Performed by: NURSE ANESTHETIST, CERTIFIED REGISTERED

## 2017-09-20 PROCEDURE — 36000093 ZZH SURGERY LEVEL 4 1ST 30 MIN: Performed by: UROLOGY

## 2017-09-20 PROCEDURE — 74010 XR KUB: CPT | Mod: 52

## 2017-09-20 PROCEDURE — 25000566 ZZH SEVOFLURANE, EA 15 MIN: Performed by: UROLOGY

## 2017-09-20 PROCEDURE — 50590 FRAGMENTING OF KIDNEY STONE: CPT | Mod: 50 | Performed by: UROLOGY

## 2017-09-20 PROCEDURE — C1769 GUIDE WIRE: HCPCS | Performed by: UROLOGY

## 2017-09-20 PROCEDURE — 37000008 ZZH ANESTHESIA TECHNICAL FEE, 1ST 30 MIN: Performed by: UROLOGY

## 2017-09-20 PROCEDURE — 71000012 ZZH RECOVERY PHASE 1 LEVEL 1 FIRST HR: Performed by: UROLOGY

## 2017-09-20 PROCEDURE — 25000125 ZZHC RX 250: Performed by: UROLOGY

## 2017-09-20 RX ORDER — ONDANSETRON 2 MG/ML
4 INJECTION INTRAMUSCULAR; INTRAVENOUS EVERY 30 MIN PRN
Status: DISCONTINUED | OUTPATIENT
Start: 2017-09-20 | End: 2017-09-20 | Stop reason: HOSPADM

## 2017-09-20 RX ORDER — HYDROMORPHONE HYDROCHLORIDE 1 MG/ML
.3-.5 INJECTION, SOLUTION INTRAMUSCULAR; INTRAVENOUS; SUBCUTANEOUS EVERY 10 MIN PRN
Status: DISCONTINUED | OUTPATIENT
Start: 2017-09-20 | End: 2017-09-20 | Stop reason: HOSPADM

## 2017-09-20 RX ORDER — HYDROMORPHONE HYDROCHLORIDE 1 MG/ML
.2-.4 INJECTION, SOLUTION INTRAMUSCULAR; INTRAVENOUS; SUBCUTANEOUS
Status: DISCONTINUED | OUTPATIENT
Start: 2017-09-20 | End: 2017-09-21 | Stop reason: HOSPADM

## 2017-09-20 RX ORDER — NALOXONE HYDROCHLORIDE 0.4 MG/ML
.1-.4 INJECTION, SOLUTION INTRAMUSCULAR; INTRAVENOUS; SUBCUTANEOUS
Status: DISCONTINUED | OUTPATIENT
Start: 2017-09-20 | End: 2017-09-21 | Stop reason: HOSPADM

## 2017-09-20 RX ORDER — CEFAZOLIN SODIUM 1 G/3ML
1 INJECTION, POWDER, FOR SOLUTION INTRAMUSCULAR; INTRAVENOUS SEE ADMIN INSTRUCTIONS
Status: DISCONTINUED | OUTPATIENT
Start: 2017-09-20 | End: 2017-09-20 | Stop reason: HOSPADM

## 2017-09-20 RX ORDER — FENTANYL CITRATE 0.05 MG/ML
25-50 INJECTION, SOLUTION INTRAMUSCULAR; INTRAVENOUS
Status: DISCONTINUED | OUTPATIENT
Start: 2017-09-20 | End: 2017-09-20 | Stop reason: HOSPADM

## 2017-09-20 RX ORDER — ONDANSETRON 4 MG/1
4 TABLET, ORALLY DISINTEGRATING ORAL EVERY 30 MIN PRN
Status: DISCONTINUED | OUTPATIENT
Start: 2017-09-20 | End: 2017-09-20 | Stop reason: HOSPADM

## 2017-09-20 RX ORDER — FENTANYL CITRATE 50 UG/ML
25-50 INJECTION, SOLUTION INTRAMUSCULAR; INTRAVENOUS
Status: DISCONTINUED | OUTPATIENT
Start: 2017-09-20 | End: 2017-09-20 | Stop reason: HOSPADM

## 2017-09-20 RX ORDER — SODIUM CHLORIDE, SODIUM LACTATE, POTASSIUM CHLORIDE, CALCIUM CHLORIDE 600; 310; 30; 20 MG/100ML; MG/100ML; MG/100ML; MG/100ML
INJECTION, SOLUTION INTRAVENOUS CONTINUOUS
Status: DISCONTINUED | OUTPATIENT
Start: 2017-09-20 | End: 2017-09-20 | Stop reason: HOSPADM

## 2017-09-20 RX ORDER — HYDRALAZINE HYDROCHLORIDE 20 MG/ML
2.5-5 INJECTION INTRAMUSCULAR; INTRAVENOUS EVERY 10 MIN PRN
Status: DISCONTINUED | OUTPATIENT
Start: 2017-09-20 | End: 2017-09-20 | Stop reason: HOSPADM

## 2017-09-20 RX ORDER — ALBUTEROL SULFATE 0.83 MG/ML
2.5 SOLUTION RESPIRATORY (INHALATION) EVERY 4 HOURS PRN
Status: DISCONTINUED | OUTPATIENT
Start: 2017-09-20 | End: 2017-09-20 | Stop reason: HOSPADM

## 2017-09-20 RX ORDER — MEPERIDINE HYDROCHLORIDE 25 MG/ML
12.5 INJECTION INTRAMUSCULAR; INTRAVENOUS; SUBCUTANEOUS
Status: DISCONTINUED | OUTPATIENT
Start: 2017-09-20 | End: 2017-09-20 | Stop reason: HOSPADM

## 2017-09-20 RX ORDER — ACETAMINOPHEN 325 MG/1
975 TABLET ORAL EVERY 8 HOURS
Status: DISCONTINUED | OUTPATIENT
Start: 2017-09-20 | End: 2017-09-21 | Stop reason: HOSPADM

## 2017-09-20 RX ORDER — LIDOCAINE HYDROCHLORIDE 20 MG/ML
INJECTION, SOLUTION INFILTRATION; PERINEURAL PRN
Status: DISCONTINUED | OUTPATIENT
Start: 2017-09-20 | End: 2017-09-20

## 2017-09-20 RX ORDER — OXYCODONE AND ACETAMINOPHEN 5; 325 MG/1; MG/1
1 TABLET ORAL ONCE
Status: COMPLETED | OUTPATIENT
Start: 2017-09-20 | End: 2017-09-20

## 2017-09-20 RX ORDER — NALOXONE HYDROCHLORIDE 0.4 MG/ML
.1-.4 INJECTION, SOLUTION INTRAMUSCULAR; INTRAVENOUS; SUBCUTANEOUS
Status: DISCONTINUED | OUTPATIENT
Start: 2017-09-20 | End: 2017-09-20 | Stop reason: HOSPADM

## 2017-09-20 RX ORDER — CEFAZOLIN SODIUM 2 G/100ML
2 INJECTION, SOLUTION INTRAVENOUS
Status: COMPLETED | OUTPATIENT
Start: 2017-09-20 | End: 2017-09-20

## 2017-09-20 RX ORDER — PROPOFOL 10 MG/ML
INJECTION, EMULSION INTRAVENOUS PRN
Status: DISCONTINUED | OUTPATIENT
Start: 2017-09-20 | End: 2017-09-20

## 2017-09-20 RX ORDER — MEPERIDINE HYDROCHLORIDE 25 MG/ML
12.5 INJECTION INTRAMUSCULAR; INTRAVENOUS; SUBCUTANEOUS ONCE
Status: COMPLETED | OUTPATIENT
Start: 2017-09-20 | End: 2017-09-20

## 2017-09-20 RX ORDER — FENTANYL CITRATE 0.05 MG/ML
25-50 INJECTION, SOLUTION INTRAMUSCULAR; INTRAVENOUS EVERY 5 MIN PRN
Status: DISCONTINUED | OUTPATIENT
Start: 2017-09-20 | End: 2017-09-20 | Stop reason: HOSPADM

## 2017-09-20 RX ORDER — SODIUM CHLORIDE, SODIUM LACTATE, POTASSIUM CHLORIDE, CALCIUM CHLORIDE 600; 310; 30; 20 MG/100ML; MG/100ML; MG/100ML; MG/100ML
INJECTION, SOLUTION INTRAVENOUS CONTINUOUS PRN
Status: DISCONTINUED | OUTPATIENT
Start: 2017-09-20 | End: 2017-09-20

## 2017-09-20 RX ORDER — ONDANSETRON 2 MG/ML
4 INJECTION INTRAMUSCULAR; INTRAVENOUS ONCE
Status: COMPLETED | OUTPATIENT
Start: 2017-09-20 | End: 2017-09-20

## 2017-09-20 RX ORDER — HYDROMORPHONE HYDROCHLORIDE 1 MG/ML
0.2 INJECTION, SOLUTION INTRAMUSCULAR; INTRAVENOUS; SUBCUTANEOUS
Status: DISCONTINUED | OUTPATIENT
Start: 2017-09-20 | End: 2017-09-20

## 2017-09-20 RX ORDER — DEXAMETHASONE SODIUM PHOSPHATE 4 MG/ML
INJECTION, SOLUTION INTRA-ARTICULAR; INTRALESIONAL; INTRAMUSCULAR; INTRAVENOUS; SOFT TISSUE PRN
Status: DISCONTINUED | OUTPATIENT
Start: 2017-09-20 | End: 2017-09-20

## 2017-09-20 RX ORDER — HYDROMORPHONE HYDROCHLORIDE 1 MG/ML
0.5 INJECTION, SOLUTION INTRAMUSCULAR; INTRAVENOUS; SUBCUTANEOUS ONCE
Status: COMPLETED | OUTPATIENT
Start: 2017-09-20 | End: 2017-09-20

## 2017-09-20 RX ORDER — FENTANYL CITRATE 50 UG/ML
INJECTION, SOLUTION INTRAMUSCULAR; INTRAVENOUS PRN
Status: DISCONTINUED | OUTPATIENT
Start: 2017-09-20 | End: 2017-09-20

## 2017-09-20 RX ORDER — OXYCODONE HYDROCHLORIDE 5 MG/1
5-10 TABLET ORAL
Status: DISCONTINUED | OUTPATIENT
Start: 2017-09-20 | End: 2017-09-21 | Stop reason: HOSPADM

## 2017-09-20 RX ORDER — ONDANSETRON 4 MG/1
4 TABLET, ORALLY DISINTEGRATING ORAL EVERY 6 HOURS PRN
Status: DISCONTINUED | OUTPATIENT
Start: 2017-09-20 | End: 2017-09-21 | Stop reason: HOSPADM

## 2017-09-20 RX ORDER — ONDANSETRON 2 MG/ML
4 INJECTION INTRAMUSCULAR; INTRAVENOUS EVERY 6 HOURS PRN
Status: DISCONTINUED | OUTPATIENT
Start: 2017-09-20 | End: 2017-09-21 | Stop reason: HOSPADM

## 2017-09-20 RX ORDER — SODIUM CHLORIDE 9 MG/ML
INJECTION, SOLUTION INTRAVENOUS CONTINUOUS
Status: DISCONTINUED | OUTPATIENT
Start: 2017-09-20 | End: 2017-09-21 | Stop reason: HOSPADM

## 2017-09-20 RX ORDER — PROPOFOL 10 MG/ML
INJECTION, EMULSION INTRAVENOUS CONTINUOUS PRN
Status: DISCONTINUED | OUTPATIENT
Start: 2017-09-20 | End: 2017-09-20

## 2017-09-20 RX ORDER — OXYCODONE AND ACETAMINOPHEN 7.5; 325 MG/1; MG/1
1 TABLET ORAL EVERY 6 HOURS PRN
Qty: 18 TABLET | Refills: 0 | Status: SHIPPED | OUTPATIENT
Start: 2017-09-20 | End: 2017-12-22

## 2017-09-20 RX ORDER — PROCHLORPERAZINE MALEATE 5 MG
5-10 TABLET ORAL EVERY 6 HOURS PRN
Status: DISCONTINUED | OUTPATIENT
Start: 2017-09-20 | End: 2017-09-21 | Stop reason: HOSPADM

## 2017-09-20 RX ORDER — TAMSULOSIN HYDROCHLORIDE 0.4 MG/1
0.4 CAPSULE ORAL DAILY
Qty: 30 CAPSULE | Refills: 1 | Status: SHIPPED | OUTPATIENT
Start: 2017-09-20 | End: 2017-12-22

## 2017-09-20 RX ADMIN — MIDAZOLAM HYDROCHLORIDE 2 MG: 1 INJECTION, SOLUTION INTRAMUSCULAR; INTRAVENOUS at 14:22

## 2017-09-20 RX ADMIN — SODIUM CHLORIDE, POTASSIUM CHLORIDE, SODIUM LACTATE AND CALCIUM CHLORIDE: 600; 310; 30; 20 INJECTION, SOLUTION INTRAVENOUS at 16:00

## 2017-09-20 RX ADMIN — OXYCODONE HYDROCHLORIDE 10 MG: 5 TABLET ORAL at 20:34

## 2017-09-20 RX ADMIN — FENTANYL CITRATE 50 MCG: 50 INJECTION, SOLUTION INTRAMUSCULAR; INTRAVENOUS at 16:39

## 2017-09-20 RX ADMIN — HYDROMORPHONE HYDROCHLORIDE 0.5 MG: 1 INJECTION, SOLUTION INTRAMUSCULAR; INTRAVENOUS; SUBCUTANEOUS at 18:50

## 2017-09-20 RX ADMIN — ATROPA BELLADONNA AND OPIUM 1 SUPPOSITORY: 16.2; 3 SUPPOSITORY RECTAL at 22:58

## 2017-09-20 RX ADMIN — OXYCODONE HYDROCHLORIDE AND ACETAMINOPHEN 1 TABLET: 5; 325 TABLET ORAL at 17:20

## 2017-09-20 RX ADMIN — DEXAMETHASONE SODIUM PHOSPHATE 4 MG: 4 INJECTION, SOLUTION INTRA-ARTICULAR; INTRALESIONAL; INTRAMUSCULAR; INTRAVENOUS; SOFT TISSUE at 14:22

## 2017-09-20 RX ADMIN — PROPOFOL 120 MCG/KG/MIN: 10 INJECTION, EMULSION INTRAVENOUS at 14:24

## 2017-09-20 RX ADMIN — HYDROMORPHONE HYDROCHLORIDE 0.4 MG: 1 INJECTION, SOLUTION INTRAMUSCULAR; INTRAVENOUS; SUBCUTANEOUS at 21:54

## 2017-09-20 RX ADMIN — HYDROMORPHONE HYDROCHLORIDE 0.5 MG: 1 INJECTION, SOLUTION INTRAMUSCULAR; INTRAVENOUS; SUBCUTANEOUS at 18:00

## 2017-09-20 RX ADMIN — PROPOFOL 200 MG: 10 INJECTION, EMULSION INTRAVENOUS at 14:22

## 2017-09-20 RX ADMIN — FENTANYL CITRATE 50 MCG: 50 INJECTION, SOLUTION INTRAMUSCULAR; INTRAVENOUS at 14:57

## 2017-09-20 RX ADMIN — FENTANYL CITRATE 50 MCG: 50 INJECTION, SOLUTION INTRAMUSCULAR; INTRAVENOUS at 17:13

## 2017-09-20 RX ADMIN — FENTANYL CITRATE 50 MCG: 50 INJECTION, SOLUTION INTRAMUSCULAR; INTRAVENOUS at 19:33

## 2017-09-20 RX ADMIN — SODIUM CHLORIDE, POTASSIUM CHLORIDE, SODIUM LACTATE AND CALCIUM CHLORIDE: 600; 310; 30; 20 INJECTION, SOLUTION INTRAVENOUS at 15:10

## 2017-09-20 RX ADMIN — FENTANYL CITRATE 50 MCG: 50 INJECTION, SOLUTION INTRAMUSCULAR; INTRAVENOUS at 14:22

## 2017-09-20 RX ADMIN — FENTANYL CITRATE 50 MCG: 50 INJECTION, SOLUTION INTRAMUSCULAR; INTRAVENOUS at 17:32

## 2017-09-20 RX ADMIN — OXYCODONE HYDROCHLORIDE 10 MG: 5 TABLET ORAL at 23:48

## 2017-09-20 RX ADMIN — ONDANSETRON 4 MG: 2 SOLUTION INTRAMUSCULAR; INTRAVENOUS at 23:48

## 2017-09-20 RX ADMIN — HYDROMORPHONE HYDROCHLORIDE 0.5 MG: 1 INJECTION, SOLUTION INTRAMUSCULAR; INTRAVENOUS; SUBCUTANEOUS at 21:03

## 2017-09-20 RX ADMIN — SODIUM CHLORIDE, POTASSIUM CHLORIDE, SODIUM LACTATE AND CALCIUM CHLORIDE: 600; 310; 30; 20 INJECTION, SOLUTION INTRAVENOUS at 14:20

## 2017-09-20 RX ADMIN — SODIUM CHLORIDE: 9 INJECTION, SOLUTION INTRAVENOUS at 20:23

## 2017-09-20 RX ADMIN — ONDANSETRON 4 MG: 2 SOLUTION INTRAMUSCULAR; INTRAVENOUS at 19:03

## 2017-09-20 RX ADMIN — LIDOCAINE HYDROCHLORIDE 50 MG: 20 INJECTION, SOLUTION INFILTRATION; PERINEURAL at 14:22

## 2017-09-20 RX ADMIN — MEPERIDINE HYDROCHLORIDE 12.5 MG: 25 INJECTION, SOLUTION INTRAMUSCULAR; INTRAVENOUS; SUBCUTANEOUS at 18:47

## 2017-09-20 RX ADMIN — OXYCODONE HYDROCHLORIDE AND ACETAMINOPHEN 1 TABLET: 5; 325 TABLET ORAL at 17:16

## 2017-09-20 RX ADMIN — CEFAZOLIN SODIUM 2 G: 2 INJECTION, SOLUTION INTRAVENOUS at 14:29

## 2017-09-20 ASSESSMENT — LIFESTYLE VARIABLES: TOBACCO_USE: 1

## 2017-09-20 NOTE — IP AVS SNAPSHOT
[facility]    [address] [place] [address]    Phone:  [phone]                                       After Visit Summary   9/20/2017    Jak Villalpando    MRN: [id number]           After Visit Summary Signature Page     I have received my discharge instructions, and my questions have been answered. I have discussed any challenges I see with this plan with the nurse or doctor.    ..........................................................................................................................................  Patient/Patient Representative Signature      ..........................................................................................................................................  Patient Representative Print Name and Relationship to Patient    ..................................................               ................................................  Date                                            Time    ..........................................................................................................................................  Reviewed by Signature/Title    ...................................................              ..............................................  Date                                                            Time

## 2017-09-20 NOTE — ANESTHESIA PREPROCEDURE EVALUATION
Anesthesia Evaluation     . Pt has had prior anesthetic. Type: General    No history of anesthetic complications          ROS/MED HX    ENT/Pulmonary:     (+)tobacco use, Past use , . .    Neurologic:     (+)migraines,     Cardiovascular:  - neg cardiovascular ROS   (+) Dyslipidemia, ----. : . . . :. .       METS/Exercise Tolerance:     Hematologic:         Musculoskeletal:         GI/Hepatic:  - neg GI/hepatic ROS       Renal/Genitourinary:     (+) Nephrolithiasis ,    (-) renal disease   Endo:     (+) Obesity, .      Psychiatric:         Infectious Disease:         Malignancy:         Other:                     Physical Exam  Normal systems: cardiovascular, pulmonary and dental    Airway   Mallampati: II  TM distance: >3 FB  Neck ROM: full    Dental     Cardiovascular   Rhythm and rate: regular and normal      Pulmonary    breath sounds clear to auscultation                    Anesthesia Plan      History & Physical Review  History and physical reviewed and following examination; no interval change.    ASA Status:  2 .    NPO Status:  > 8 hours    Plan for General and LMA with Propofol induction. Maintenance will be TIVA.    PONV prophylaxis:  Ondansetron (or other 5HT-3) and Dexamethasone or Solumedrol       Postoperative Care  Postoperative pain management:  IV analgesics and Oral pain medications.      Consents  Anesthetic plan, risks, benefits and alternatives discussed with:  Patient..                          .

## 2017-09-20 NOTE — ANESTHESIA POSTPROCEDURE EVALUATION
Patient: Jak Villalpando    Procedure(s):   BILATERAL EXTRACORPOREAL SHOCK WAVE LITHOTRIPSY  - Wound Class: I-Clean    Diagnosis:KIDNEY STONES  Diagnosis Additional Information: No value filed.    Anesthesia Type:  General, LMA    Note:  Anesthesia Post Evaluation    Patient location during evaluation: PACU  Patient participation: Able to fully participate in evaluation  Level of consciousness: awake  Pain management: adequate  Airway patency: patent  Cardiovascular status: acceptable  Respiratory status: acceptable  Hydration status: acceptable  PONV: none     Anesthetic complications: None          Last vitals:  Vitals:    09/20/17 1625 09/20/17 1629 09/20/17 1630   BP: (!) 135/102 139/87 139/87   Resp: 16 17 14   Temp: 35.8  C (96.5  F)     SpO2: 100% 99% 100%         Electronically Signed By: Joaquin Barnard MD  September 20, 2017  4:34 PM

## 2017-09-20 NOTE — BRIEF OP NOTE
[facility] Brief Operative Note    Pre-operative diagnosis: KIDNEY STONES   Post-operative diagnosis Bilateral renal calculi     Procedure: Procedure(s):   BILATERAL EXTRACORPOREAL SHOCK WAVE LITHOTRIPSY  - Wound Class: I-Clean   Surgeon(s): Surgeon(s) and Role:   Bharath Reyez MD - Primary   Estimated blood loss:  no estimate    Specimens:  none   Findings:

## 2017-09-20 NOTE — ANESTHESIA CARE TRANSFER NOTE
Patient: Jak Villalpando    Procedure(s):   BILATERAL EXTRACORPOREAL SHOCK WAVE LITHOTRIPSY  - Wound Class: I-Clean    Diagnosis: KIDNEY STONES  Diagnosis Additional Information: No value filed.    Anesthesia Type:   General, LMA     Note:  Airway :Face Mask  Patient transferred to:PACU  Comments: Transferred to PACU, spontaneous respirations, 10L oxygen via facemask.  All monitors and alarms on and functioning, VSS.  Patient awake, comfortable.  Report to PACU RN.      Vitals: (Last set prior to Anesthesia Care Transfer)    CRNA VITALS  9/20/2017 1553 - 9/20/2017 1625      9/20/2017             Pulse: 65    SpO2: 97 %    Resp Rate (observed): (!)  4                Electronically Signed By: DANISHA Stafford CRNA  September 20, 2017  4:25 PM

## 2017-09-20 NOTE — IP AVS SNAPSHOT
MRN:[id number]                      After Visit Summary   9/20/2017    Jak Villalpando    MRN: [id number]           Thank you!     Thank you for choosing [facility] for your care. Our goal is always to provide you with excellent care. Hearing back from our patients is one way we can continue to improve our services. Please take a few minutes to complete the written survey that you may receive in the mail after you visit with us. Thank you!        Patient Information     Date Of Birth          1970        About your hospital stay     You were admitted on:  September 20, 2017 [facility]    You were discharged on:  September 21, 2017        Reason for your hospital stay       Renal Stone                  Who to Call     For medical emergencies, please call 911.  For non-urgent questions about your medical care, please call your primary care provider or clinic, [phone]  For questions related to your surgery, please call your surgery clinic        Attending Provider     Provider Specialty    Bharath Reyez MD Urology       Primary Care Provider Office Phone # Fax #    Heide Fiordaliza Maciel [phone]      Follow-up Appointments     Follow-up and recommended labs and tests        Follow up with Dr. Reyez , at (location with clinic name or city) [facility], within 4 weeks  to evaluate after surgery. The following labs/tests are recommended: KUB.                  Further instructions from your care team       F/U with Xray (KUB) in 3-4 weeks    Same Day Surgery Discharge Instructions for  Sedation and General Anesthesia       It's not unusual to feel dizzy, light-headed or faint for up to 24 hours after surgery or while taking pain medication.  If you have these symptoms: sit for a few minutes before standing and have someone assist you when you get up to walk or use the bathroom.      You should rest and relax for the next 24 hours.  We recommend you make arrangements to have an adult stay with you for at least 24 hours after your discharge.  Avoid hazardous and strenuous activity.      DO NOT DRIVE any vehicle or operate mechanical equipment for 24 hours following the end of your surgery.  Even though you may feel normal, your reactions may be affected by the medication you have received.      Do not drink alcoholic beverages for 24 hours following surgery.       Slowly progress to your regular diet as you feel able. It's not unusual to feel nauseated and/or vomit after receiving anesthesia.  If you develop these symptoms, drink clear liquids (apple juice, ginger ale, broth, 7-up, etc. ) until you feel better.  If your nausea and vomiting persists for 24 hours, please notify your surgeon.        All narcotic pain medications, along with inactivity and anesthesia, can cause constipation. Drinking plenty of liquids and increasing fiber intake will help.      For any questions of a medical nature, call your surgeon.      Do not make important decisions for 24 hours.      If you had general anesthesia, you may have a sore throat for a couple of days related to the breathing tube used during surgery.  You may use Cepacol lozenges to help with this discomfort.  If it worsens or if you develop a fever, contact your surgeon.       If you feel your pain is not well managed with the pain medications prescribed by your surgeon, please contact your surgeon's office to let them know so they can address your concerns.       DISCHARGE INSTRUCTIONS FOLLOWING EXTRACORPOREAL SHOCK LITHOTRIPSY (ESWL)      Your stone(s) has been fragmented into many tiny pieces, which must now pass in your urine.  Usually this process is uneventful.  Most fragments pass in the first one or two week, but some may continue to pass for three months or more.  Some pain or discomfort may accompany the passage of these fragments.    To aid in the passage of fragments, drink lots of  fluid.  Aim for 1 glass an hour for the next 1 to 2 weeks (2 quarts or more a day).  Most stone patients will benefit from a continued high fluid intake and urine output indefinitely, even after the fragments are gone.  This helps prevent new stone formation.    Strain your urine.  Take the stone fragments to your urologist.  They will have them analyzed to help determine the cause of your stone(s).    You should walk around and resume every day activities.  Activity may help the stone fragments pass.  You should, however, avoid sports or really strenuous exercise for about a week, or at least until there is no more blood in your urine.    You may resume regular diet.    Call your urologist if you have:  a. Persistent severe pain not relieved by oral medications.  b. Fever (over 101 ).  c. Persistent vomiting.    See your urologist as directed.  They will need to take an x-ray to check your progress.  Take your stone fragments to your follow-up appointment.      **If you have questions or concerns about your procedure,  call Dr. Reyez at [phone]**    Pending Results     Date and Time Order Name Status Description    9/21/2017 0751 Stone analysis In process     9/21/2017 0005 XR KUB In process             Statement of Approval     Ordered          09/21/17 0746  I have reviewed and agree with all the recommendations and orders detailed in this document.  EFFECTIVE NOW     Approved and electronically signed by:  Jacob Delvalle MD             Admission Information     Date & Time Provider Department Dept. Phone    9/20/2017 Bharath Reyez MD [facility] [phone]      Your Vitals Were     Blood Pressure Temperature Respirations Height Weight Pulse Oximetry    132/52 96.5  F (35.8  C) 16 1.829 m (6') 106.8 kg (235 lb 8 oz) 93%    BMI (Body Mass Index)                   31.94 kg/m2           MyChart Information     [facility] lets you send messages to your doctor, view your test results,  "renew your prescriptions, schedule appointments and more. To sign up, go to [email or web address] . Click on \"Log in\" on the left side of the screen, which will take you to the Welcome page. Then click on \"Sign up Now\" on the right side of the page.     You will be asked to enter the access code listed below, as well as some personal information. Please follow the directions to create your username and password.     Your access code is: [email or web address]  Expires: 2017  9:00 AM     Your access code will  in 90 days. If you need help or a new code, please call your [facility] [place] clinic or [phone].        Care EveryWhere ID     This is your Care EveryWhere ID. This could be used by other organizations to access your [facility] [place] medical records  [id number]        Equal Access to Services     YELENA BOOTH : Whitney Car, guy solares, hugo coates, alyx nolasco . So [facility] [place] [facility] [phone].    ATENCIÓN: Si habla español, tiene a sellers disposición servicios gratuitos de asistencia lingüística. Llame al [phone].    We comply with applicable federal civil rights laws and [place] laws. We do not discriminate on the basis of race, color, national origin, age, disability sex, sexual orientation or gender identity.               Review of your medicines      START taking        Dose / Directions    * oxyCODONE-acetaminophen 7.5-325 MG per tablet   Commonly known as:  PERCOCET   Used for:  Calculus of kidney   Notes to Patient:  Given at 5:20 pm        Dose:  1 tablet   Take 1 tablet by mouth every 6 hours as needed for moderate to severe pain maximum 4 tablet(s) per day   Quantity:  18 tablet   Refills:  0       * oxyCODONE-acetaminophen 5-325 MG per tablet   Commonly known as:  PERCOCET   Used for:  Calculus of kidney        Dose:  1 tablet   Take 1 tablet by mouth every 4 hours as needed for pain maximum 4 tablet(s) per day   Quantity:  12 tablet   Refills:  " 0       * tamsulosin 0.4 MG capsule   Commonly known as:  FLOMAX   Used for:  Calculus of kidney        Dose:  0.4 mg   Take 1 capsule (0.4 mg) by mouth daily   Quantity:  30 capsule   Refills:  1       * tamsulosin 0.4 MG capsule   Commonly known as:  FLOMAX   Used for:  Calculus of kidney        Dose:  0.4 mg   Take 1 capsule (0.4 mg) by mouth daily   Quantity:  15 capsule   Refills:  3       * Notice:  This list has 4 medication(s) that are the same as other medications prescribed for you. Read the directions carefully, and ask your doctor or other care provider to review them with you.      CONTINUE these medicines which have NOT CHANGED        Dose / Directions    augmented betamethasone dipropionate 0.05 % ointment   Commonly known as:  DIPROLENE-AF        APPLY TOPICALLY TO RASH TWICE DAILY   Refills:  1            Where to get your medicines      These medications were sent to [facility] - [address]  [address], [place] [address]     Phone:  [phone]     tamsulosin 0.4 MG capsule         Some of these will need a paper prescription and others can be bought over the counter. Ask your nurse if you have questions.     Bring a paper prescription for each of these medications     oxyCODONE-acetaminophen 5-325 MG per tablet    oxyCODONE-acetaminophen 7.5-325 MG per tablet    tamsulosin 0.4 MG capsule                Protect others around you: Learn how to safely use, store and throw away your medicines at www.disposemymeds.org.             Medication List: This is a list of all your medications and when to take them. Check marks below indicate your daily home schedule. Keep this list as a reference.      Medications           Morning Afternoon Evening Bedtime As Needed    augmented betamethasone dipropionate 0.05 % ointment   Commonly known as:  DIPROLENE-AF   APPLY TOPICALLY TO RASH TWICE DAILY                                      * oxyCODONE-acetaminophen  7.5-325 MG per tablet   Commonly known as:  PERCOCET   Take 1 tablet by mouth every 6 hours as needed for moderate to severe pain maximum 4 tablet(s) per day   Notes to Patient:  Given at 5:20 pm                                   * oxyCODONE-acetaminophen 5-325 MG per tablet   Commonly known as:  PERCOCET   Take 1 tablet by mouth every 4 hours as needed for pain maximum 4 tablet(s) per day   Last time this was given:  1 tablet on 9/20/2017  5:20 PM                                * tamsulosin 0.4 MG capsule   Commonly known as:  FLOMAX   Take 1 capsule (0.4 mg) by mouth daily   Last time this was given:  0.4 mg on 9/21/2017  8:43 AM   Next Dose Due:  9/22                                   * tamsulosin 0.4 MG capsule   Commonly known as:  FLOMAX   Take 1 capsule (0.4 mg) by mouth daily   Last time this was given:  0.4 mg on 9/21/2017  8:43 AM                                * Notice:  This list has 4 medication(s) that are the same as other medications prescribed for you. Read the directions carefully, and ask your doctor or other care provider to review them with you.

## 2017-09-20 NOTE — DISCHARGE INSTRUCTIONS
F/U with Xray (LUIS) in 3-4 weeks    Same Day Surgery Discharge Instructions for  Sedation and General Anesthesia       It's not unusual to feel dizzy, light-headed or faint for up to 24 hours after surgery or while taking pain medication.  If you have these symptoms: sit for a few minutes before standing and have someone assist you when you get up to walk or use the bathroom.      You should rest and relax for the next 24 hours. We recommend you make arrangements to have an adult stay with you for at least 24 hours after your discharge.  Avoid hazardous and strenuous activity.      DO NOT DRIVE any vehicle or operate mechanical equipment for 24 hours following the end of your surgery.  Even though you may feel normal, your reactions may be affected by the medication you have received.      Do not drink alcoholic beverages for 24 hours following surgery.       Slowly progress to your regular diet as you feel able. It's not unusual to feel nauseated and/or vomit after receiving anesthesia.  If you develop these symptoms, drink clear liquids (apple juice, ginger ale, broth, 7-up, etc. ) until you feel better.  If your nausea and vomiting persists for 24 hours, please notify your surgeon.        All narcotic pain medications, along with inactivity and anesthesia, can cause constipation. Drinking plenty of liquids and increasing fiber intake will help.      For any questions of a medical nature, call your surgeon.      Do not make important decisions for 24 hours.      If you had general anesthesia, you may have a sore throat for a couple of days related to the breathing tube used during surgery.  You may use Cepacol lozenges to help with this discomfort.  If it worsens or if you develop a fever, contact your surgeon.       If you feel your pain is not well managed with the pain medications prescribed by your surgeon, please contact your surgeon's office to let them know so they can address your concerns.       DISCHARGE  INSTRUCTIONS FOLLOWING EXTRACORPOREAL SHOCK LITHOTRIPSY (ESWL)      Your stone(s) has been fragmented into many tiny pieces, which must now pass in your urine.  Usually this process is uneventful.  Most fragments pass in the first one or two week, but some may continue to pass for three months or more.  Some pain or discomfort may accompany the passage of these fragments.    To aid in the passage of fragments, drink lots of fluid.  Aim for 1 glass an hour for the next 1 to 2 weeks (2 quarts or more a day).  Most stone patients will benefit from a continued high fluid intake and urine output indefinitely, even after the fragments are gone.  This helps prevent new stone formation.    Strain your urine.  Take the stone fragments to your urologist.  They will have them analyzed to help determine the cause of your stone(s).    You should walk around and resume every day activities.  Activity may help the stone fragments pass.  You should, however, avoid sports or really strenuous exercise for about a week, or at least until there is no more blood in your urine.    You may resume regular diet.    Call your urologist if you have:  a. Persistent severe pain not relieved by oral medications.  b. Fever (over 101 ).  c. Persistent vomiting.    See your urologist as directed.  They will need to take an x-ray to check your progress.  Take your stone fragments to your follow-up appointment.      **If you have questions or concerns about your procedure,  call Dr. Reyez at [phone]**

## 2017-09-21 ENCOUNTER — APPOINTMENT (OUTPATIENT)
Dept: GENERAL RADIOLOGY | Facility: CLINIC | Age: 47
End: 2017-09-21
Attending: UROLOGY
Payer: COMMERCIAL

## 2017-09-21 VITALS
SYSTOLIC BLOOD PRESSURE: 132 MMHG | TEMPERATURE: 96.5 F | BODY MASS INDEX: 31.9 KG/M2 | RESPIRATION RATE: 16 BRPM | HEIGHT: 72 IN | OXYGEN SATURATION: 93 % | DIASTOLIC BLOOD PRESSURE: 52 MMHG | WEIGHT: 235.5 LBS

## 2017-09-21 LAB — GLUCOSE BLDC GLUCOMTR-MCNC: 147 MG/DL (ref 70–99)

## 2017-09-21 PROCEDURE — 88300 SURGICAL PATH GROSS: CPT | Performed by: UROLOGY

## 2017-09-21 PROCEDURE — 25000132 ZZH RX MED GY IP 250 OP 250 PS 637: Performed by: UROLOGY

## 2017-09-21 PROCEDURE — G0378 HOSPITAL OBSERVATION PER HR: HCPCS

## 2017-09-21 PROCEDURE — 25000128 H RX IP 250 OP 636: Performed by: UROLOGY

## 2017-09-21 PROCEDURE — 88300 SURGICAL PATH GROSS: CPT | Mod: 26 | Performed by: UROLOGY

## 2017-09-21 PROCEDURE — 74010 XR KUB: CPT | Mod: 52

## 2017-09-21 PROCEDURE — 82365 CALCULUS SPECTROSCOPY: CPT | Performed by: UROLOGY

## 2017-09-21 PROCEDURE — 00000146 ZZHCL STATISTIC GLUCOSE BY METER IP

## 2017-09-21 RX ORDER — OXYCODONE AND ACETAMINOPHEN 5; 325 MG/1; MG/1
1 TABLET ORAL EVERY 4 HOURS PRN
Qty: 12 TABLET | Refills: 0 | Status: SHIPPED | OUTPATIENT
Start: 2017-09-21 | End: 2017-12-22

## 2017-09-21 RX ORDER — TAMSULOSIN HYDROCHLORIDE 0.4 MG/1
0.4 CAPSULE ORAL DAILY
Status: DISCONTINUED | OUTPATIENT
Start: 2017-09-21 | End: 2017-09-21 | Stop reason: HOSPADM

## 2017-09-21 RX ORDER — TAMSULOSIN HYDROCHLORIDE 0.4 MG/1
0.4 CAPSULE ORAL DAILY
Qty: 15 CAPSULE | Refills: 3 | Status: SHIPPED | OUTPATIENT
Start: 2017-09-21 | End: 2017-12-22

## 2017-09-21 RX ADMIN — OXYCODONE HYDROCHLORIDE 10 MG: 5 TABLET ORAL at 03:04

## 2017-09-21 RX ADMIN — HYDROMORPHONE HYDROCHLORIDE 0.4 MG: 1 INJECTION, SOLUTION INTRAMUSCULAR; INTRAVENOUS; SUBCUTANEOUS at 06:24

## 2017-09-21 RX ADMIN — HYDROMORPHONE HYDROCHLORIDE 0.3 MG: 1 INJECTION, SOLUTION INTRAMUSCULAR; INTRAVENOUS; SUBCUTANEOUS at 03:58

## 2017-09-21 RX ADMIN — HYDROMORPHONE HYDROCHLORIDE 0.4 MG: 1 INJECTION, SOLUTION INTRAMUSCULAR; INTRAVENOUS; SUBCUTANEOUS at 00:02

## 2017-09-21 RX ADMIN — PROCHLORPERAZINE EDISYLATE 10 MG: 5 INJECTION INTRAMUSCULAR; INTRAVENOUS at 03:18

## 2017-09-21 RX ADMIN — TAMSULOSIN HYDROCHLORIDE 0.4 MG: 0.4 CAPSULE ORAL at 08:43

## 2017-09-21 NOTE — PROGRESS NOTES
Patient straight catheterized for 600 cc   In intractable pain despite IV and PO narcotics  Reports allergy to ibuprofen, cannot try ketorolac  Will admit to observation overnight for pain control  Dr. Delvalle updated    Nick Chen MD, PGY-5  Urology Resident / [facility] Urology

## 2017-09-21 NOTE — PROVIDER NOTIFICATION
Patient c/o of 10/10 testicular pain, crying out, prn PO oxycodone 10mg given. Orders given dilaudid 0.5mg once, and dilaudid 0.2-0.4mg Q2hrs,  Bladder scan and insert F-cath if PVR > 250mls. Continue to monitor.

## 2017-09-21 NOTE — PLAN OF CARE
Problem: Patient Care Overview  Goal: Plan of Care/Patient Progress Review  Outcome: No Change    Observation goals: Intractable Pain PRIOR TO DISCHARGE     Comments: List all goals to be met before discharge home:   -Adequate pain control using oral analgesics - not met, pt c/o back and testicular pain   -Tolerates oral intake - not met, nausea  -Cleared for discharge per provider - not met

## 2017-09-21 NOTE — DISCHARGE SUMMARY
Patient has been discharged to home.  Home medication regimen and new medications discussed with patient and patient verbalizes understanding. Diet and activity discussed with patient and patient verbalizes understanding.  Upcoming appointments also discussed. Patient had no questions.

## 2017-09-21 NOTE — OP NOTE
DATE OF PROCEDURE:  09/20/2017      PREOPERATIVE DIAGNOSIS:  Bilateral renal calculi, recurrent.      POSTOPERATIVE DIAGNOSIS:  Bilateral renal calculi, recurrent.      PROCEDURES:  Bilateral extracorporeal shockwave lithotripsy.      SURGEON:  Bharath Reyez Jr, MD      ANESTHESIA:  General laryngeal mask.      ESTIMATED BLOOD LOSS:  No estimate.      INDICATIONS:  Jak Villalpando is a 47-year-old male with a history of calcium oxalate urolithiasis and a serum calcium of 10.2.  The patient will be obtaining a serum PTH level in the near future.  Currently, he is experiencing some left flank pain with radiation to the testicle.  At times he also has just left testicular pain that is severe and intermittent.  The patient has had no evidence of testicular torsion or testicular mass on ultrasound.  There is no evidence for epididymitis or urinary tract infection.  Plain films and CT scan show bilateral renal calculi with a 7 mm stone in the lower pole infundibulum and smaller calcifications on the lower pole janay.  Three years ago the patient had lithotripsy on the left side for similar pain complaints and this pain resolved after lithotripsy.  The patient now presents for bilateral ESWL and understands the procedure, the risks, the alternatives and followup.  Discharge medications include Tamsulosin, Percocet 7.5 mg every 6 hours p.r.n. for pain and he will follow up with me in 3 weeks with a KUB.      PROCEDURE:  The patient was given 2 grams of IV Ancef and taken to the lithotripter suite.  He was placed supine on the lithotripter table and administered a general laryngeal mask anesthetic.  His lower pole left renal calculi were digitized at the F2 focal point and a total of 200 shocks at a slow rate and low power were delivered to the stone, the main stone measuring 7 mm followed by a 2 minute pause.  We then increased the power level to 6 and then 7 and kept at a slow rate of 60 shocks per minute for a total of 3000  shocks.  Films were done at 800 shock intervals to check the progress.  We then moved to the patient's right side and digitized his 6 mm stone at the F2 focal point.  This stone was much harder to see on C-arm.  He was delivered 200 shocks at low power at 60 shocks per minute rate for 200 shocks and then followed by a 2 minute pause.  Then, we increased the power level to 6 and 7 and kept at a 60 shock per minute rate for a total of 2400 shocks.  Follow-up films were done at 800 shock intervals with good fragmentation.  The patient tolerated the procedure well and went to the recovery room in stable condition.      He will be discharged this afternoon to home and follow up as above.  He was given a return to work slip for 2017.         LEVI ARTHUR JR, MD             D: 2017 16:35   T: 2017 22:18   MT: [id number]      Name:     RADHA MCKEON   MRN:      [id number]        Account:        [id number]   :      1970           Procedure Date: 2017      Document: [id number]       cc: Heide Arthur Jr, MD

## 2017-09-21 NOTE — PROGRESS NOTES
Pain subsided  KUB, no obvious fragments seen but no residual stone seen in left kidney  Can have liquids and likely D/C later this am if doing well

## 2017-09-21 NOTE — PLAN OF CARE
Problem: Patient Care Overview  Goal: Plan of Care/Patient Progress Review  Outcome: No Change  Care Plan Summary Note: Pt A&Ox4 , VSS, afebrile. CMS  intact. C/o increased ABD pain 10/10 managed by IV dilaudid and PO oxycodone. C/O nausea and IV Zofran given. Denies SOB, chest pain, dyspnea. NPO after midnight ex for meds and chips. IVF infusing. XR KUB in AM. Inde. Continue to monitor.

## 2017-09-21 NOTE — PLAN OF CARE
"Problem: Patient Care Overview  Goal: Plan of Care/Patient Progress Review  Outcome: No Change    Observation goals: Intractable Pain PRIOR TO DISCHARGE     Comments: List all goals to be met before discharge home:   -Adequate pain control using oral analgesics - not met, pt c/o back and testicular pain   -Tolerates oral intake - not met, nausea  -Cleared for discharge per provider - not met             A&Ox4, VSS on RA with exception of high bp once overnight when pt in pain. C/o 8/10 pain in back and testicles. Pain radiates to testicles at different times over shift. Pt c/o nausea; zofran and compazine given. Pt requested slice of toast but writer encouraged to try clear liquid diet first since he was vomiting. Pt states when he had the kidney stone pain, \"it felt like someone was grabbing my testicles and squeezing them.\" He stated \"what are the chances of the stones being left in there.\" Reassured pt and encouraged clear liq diet. Pt stated that his pain is in his back and that this pain is new for him and did not happen when he had kidney stone. Stated, \"morphine and fentanyl worked before.\" Have been giving PRN oxycodone and dilaudid. Pt declines ice packs, tylenol. Pt states \"the only thing that works is the dilaudid\" and that \"it helps for about an hour.\" Voiding red urine. IV NS 75/hr. Up ad sheeba. Continue to monitor.       "

## 2017-09-21 NOTE — PLAN OF CARE
Problem: Patient Care Overview  Goal: Plan of Care/Patient Progress Review  Outcome: Improving  -Adequate pain control using oral analgesics - met  -Tolerates oral intake - met  -Cleared for discharge per provider - met      Denies pain. Voiding adequately. Sediment from urine strained. Specimen sent to lab for analysis. Tolerating regular diet. Ambulating ind. Will d/c this morning.

## 2017-09-21 NOTE — OR NURSING
Once pt was dressed, he needed to void.  Unable tovoid  Bladder scanned for 450 cc.  Talked with Dr Reyez regarding unable to void. Order for sstraight cath and then discharge.  Straight cath for 600 cc of bloody urine.  After being cath pt's pain escalated.  Pt coming off the cart.  Pt states that his pain is in his testicle.  MDA from main PACU called.  Called the oncall for Dr Reyez.  Plans are to keep pt overnight. Report given to Reji PARK.  Patient states that pain is getting a little better.

## 2017-09-21 NOTE — PLAN OF CARE
Discharge pharmacy notified RN that pt had discharge meds filled yesterday, strength and quantity different today from scripts Dr. Delvalle ordered. Pt reports he took discharge meds home yesterday from PACU. Notified Dr. Delvalle that medication dose/strength from yesterday is what pt has and scripts from today cannot be used.

## 2017-09-22 ENCOUNTER — TELEPHONE (OUTPATIENT)
Dept: FAMILY MEDICINE | Facility: CLINIC | Age: 47
End: 2017-09-22

## 2017-09-22 LAB — COPATH REPORT: NORMAL

## 2017-09-22 NOTE — TELEPHONE ENCOUNTER
"Hospital/TCU/ED for chronic condition Discharge Protocol    \"Hi, my name is Aneta Agrawal, a registered nurse, and I am calling from [facility].  I am calling to follow up and see how things are going for you after your recent emergency visit/hospital/TCU stay.\"    Tell me how you are doing now that you are home?\" Doing good.  Pain is under control.        Discharge Instructions    \"Let's review your discharge instructions.  What is/are the follow-up recommendations?  Pt. Response:  f/u with PCP if no improvement or worsening.      \"Has an appointment with your primary care provider been scheduled?\"   I have a follow up with my Urologist.  Need to schedule with PCP but have to work some OT first.  Will call back when available.    \"When you see the provider, I would recommend that you bring your medications with you.\"    Medications    \"Tell me what changed about your medicines when you discharged?\"    Changes to chronic meds?    0-1    \"What questions do you have about your medications?\"    None     New diagnoses of heart failure, COPD, diabetes, or MI?    No                  Medication reconciliation completed? Yes  Was MTM referral placed (*Make sure to put transitions as reason for referral)?   No    Call Summary    \"What questions or concerns do you have about your recent visit and your follow-up care?\"     none    \"If you have questions or things don't continue to improve, we encourage you contact us through the main clinic number (give number).  Even if the clinic is not open, triage nurses are available 24/7 to help you.     We would like you to know that our clinic has extended hours (provide information).  We also have urgent care (provide details on closest location and hours/contact info)\"      \"Thank you for your time and take care!\"    Aneta Agrawal RN       "

## 2017-09-23 LAB
APPEARANCE STONE: NORMAL
COMPN STONE: NORMAL
NUMBER STONE: NORMAL
SIZE STONE: NORMAL MM
WT STONE: NORMAL MG

## 2017-12-22 ENCOUNTER — OFFICE VISIT (OUTPATIENT)
Dept: FAMILY MEDICINE | Facility: CLINIC | Age: 47
End: 2017-12-22
Payer: COMMERCIAL

## 2017-12-22 VITALS
BODY MASS INDEX: 32.23 KG/M2 | OXYGEN SATURATION: 94 % | DIASTOLIC BLOOD PRESSURE: 88 MMHG | TEMPERATURE: 98 F | SYSTOLIC BLOOD PRESSURE: 127 MMHG | WEIGHT: 238 LBS | HEIGHT: 72 IN | RESPIRATION RATE: 16 BRPM | HEART RATE: 78 BPM

## 2017-12-22 DIAGNOSIS — N20.0 CALCIUM OXALATE STONE IN URINE: ICD-10-CM

## 2017-12-22 DIAGNOSIS — J32.9 SINUSITIS, UNSPECIFIED CHRONICITY, UNSPECIFIED LOCATION: ICD-10-CM

## 2017-12-22 DIAGNOSIS — E83.52 SERUM CALCIUM ELEVATED: ICD-10-CM

## 2017-12-22 DIAGNOSIS — N20.0 KIDNEY STONE: ICD-10-CM

## 2017-12-22 DIAGNOSIS — J00 CORYZA: Primary | ICD-10-CM

## 2017-12-22 LAB
CA-I SERPL ISE-MCNC: 4.7 MG/DL (ref 4.4–5.2)
PTH-INTACT SERPL-MCNC: 42 PG/ML (ref 12–72)

## 2017-12-22 PROCEDURE — 80053 COMPREHEN METABOLIC PANEL: CPT | Performed by: PHYSICIAN ASSISTANT

## 2017-12-22 PROCEDURE — 83970 ASSAY OF PARATHORMONE: CPT | Performed by: PHYSICIAN ASSISTANT

## 2017-12-22 PROCEDURE — 82306 VITAMIN D 25 HYDROXY: CPT | Performed by: PHYSICIAN ASSISTANT

## 2017-12-22 PROCEDURE — 82330 ASSAY OF CALCIUM: CPT | Performed by: PHYSICIAN ASSISTANT

## 2017-12-22 PROCEDURE — 36415 COLL VENOUS BLD VENIPUNCTURE: CPT | Performed by: PHYSICIAN ASSISTANT

## 2017-12-22 PROCEDURE — 99214 OFFICE O/P EST MOD 30 MIN: CPT | Performed by: FAMILY MEDICINE

## 2017-12-22 RX ORDER — AMOXICILLIN AND CLAVULANATE POTASSIUM 500; 125 MG/1; MG/1
1 TABLET, FILM COATED ORAL 3 TIMES DAILY
Qty: 30 TABLET | Refills: 0 | Status: SHIPPED | OUTPATIENT
Start: 2017-12-22 | End: 2018-01-22

## 2017-12-22 RX ORDER — FLUTICASONE PROPIONATE 50 MCG
1-2 SPRAY, SUSPENSION (ML) NASAL DAILY
Qty: 1 BOTTLE | Refills: 11 | Status: SHIPPED | OUTPATIENT
Start: 2017-12-22 | End: 2018-12-27

## 2017-12-22 NOTE — LETTER
December 26, 2017      Jak Villalpando  [address] [facility] [address]        Dear ,    Looks like you need some vitamin D, and are flirting with diabetes. Take 1000 Units of vitamin D daily. That's over the counter.  Check for diabetes at least yearly    Resulted Orders   Comprehensive metabolic panel   Result Value Ref Range    Sodium 139 133 - 144 mmol/L    Potassium 4.6 3.4 - 5.3 mmol/L    Chloride 105 94 - 109 mmol/L    Carbon Dioxide 24 20 - 32 mmol/L    Anion Gap 10 3 - 14 mmol/L    Glucose 112 (H) 70 - 99 mg/dL      Comment:      Non Fasting    Urea Nitrogen 16 7 - 30 mg/dL    Creatinine 0.96 0.66 - 1.25 mg/dL    GFR Estimate 83 >60 mL/min/1.7m2      Comment:      Non  GFR Calc    GFR Estimate If Black >90 >60 mL/min/1.7m2      Comment:       GFR Calc    Calcium 9.0 8.5 - 10.1 mg/dL    Bilirubin Total 0.5 0.2 - 1.3 mg/dL    Albumin 4.3 3.4 - 5.0 g/dL    Protein Total 7.0 6.8 - 8.8 g/dL    Alkaline Phosphatase 59 40 - 150 U/L    ALT 30 0 - 70 U/L    AST 19 0 - 45 U/L   Calcium ionized   Result Value Ref Range    Calcium Ionized 4.7 4.4 - 5.2 mg/dL   Vitamin D Deficiency   Result Value Ref Range    Vitamin D Deficiency screening 18 (L) 20 - 75 ug/L      Comment:      Season, race, dietary intake, and treatment affect the concentration of   25-hydroxy-Vitamin D. Values may decrease during winter months and increase   during summer months. Values 20-29 ug/L may indicate Vitamin D insufficiency   and values <20 ug/L may indicate Vitamin D deficiency.  Vitamin D determination is routinely performed by an immunoassay specific for   25 hydroxyvitamin D3.  If an individual is on vitamin D2 (ergocalciferol)   supplementation, please specify 25 OH vitamin D2 and D3 level determination by   LCMSMS test [id number].         If you have any questions or concerns, please call the clinic at the number listed above.       Sincerely,        Epi Beebe MD

## 2017-12-22 NOTE — PROGRESS NOTES
SUBJECTIVE:   Jak Villalpando is a 47 year old male who presents to clinic today for the following health issues:  Acute Illness   Acute illness concerns: Sinuses infection??  Onset: month    Fever: no     Chills/Sweats: YES    Headache (location?): YES    Sinus Pressure:YES    Conjunctivitis:  no    Ear Pain: no    Rhinorrhea: YES    Congestion: YES    Sore Throat: YES     Cough: YES-non-productive    Wheeze: YES    Decreased Appetite: no     Nausea: no     Vomiting: no     Diarrhea:  no     Dysuria/Freq.: no     Fatigue/Achiness: YES    Sick/Strep Exposure: no      Therapies Tried and outcome: Zyrtec, ineffective      Patient states he recently had lithotripsy was instructed by nephrology to get a lab test ordered to determine production of calcium. He believes he was hypercalcemic because he took tums for GERD, has since ceased.  Stones were mostly calcium oxalate    Problem list and histories reviewed & adjusted, as indicated.  Additional history: none    Reviewed and updated as needed this visit by clinical staff      Past Medical History:   Diagnosis Date     Migraines      Renal disease     kidney stone       Past Surgical History:   Procedure Laterality Date     CYSTOSCOPY FLEXIBLE  7/2/2014    Procedure: CYSTOSCOPY FLEXIBLE;  Surgeon: Bharath Reyez MD;  Location:  OR     EXTRACORPOREAL SHOCK WAVE LITHOTRIPSY (ESWL)  7/2/2014    Procedure: EXTRACORPOREAL SHOCK WAVE LITHOTRIPSY (ESWL);  Surgeon: Bharath Reyez MD;  Location:  OR     EXTRACORPOREAL SHOCK WAVE LITHOTRIPSY (ESWL) Bilateral 9/20/2017    Procedure: EXTRACORPOREAL SHOCK WAVE LITHOTRIPSY (ESWL);   BILATERAL EXTRACORPOREAL SHOCK WAVE LITHOTRIPSY ;  Surgeon: Bharath Reyez MD;  Location:  OR     HERNIORRHAPHY INGUINAL  5/22/2014    Procedure: HERNIORRHAPHY INGUINAL;  Surgeon: Kush Weber MD;  Location: [facility] OR       Family History   Problem Relation Age of Onset     EYE* Mother      Eye pressure     CEREBROVASCULAR DISEASE  Maternal Grandmother      DIABETES Maternal Grandmother      Hypertension Maternal Grandmother      Arthritis Maternal Grandmother      CANCER Maternal Grandfather      Brain Tumor     Arthritis Maternal Grandfather      CEREBROVASCULAR DISEASE Paternal Grandmother      DIABETES Paternal Grandmother      Eczema Son      Other - See Comments Father      brain tumor      CANCER Father      Other - See Comments Other      varicose veins  many in family        Social History   Substance Use Topics     Smoking status: Former Smoker     Packs/day: 1.00     Years: 25.00     Types: Cigarettes     Quit date: 7/1/2013     Smokeless tobacco: Former User     Alcohol use Yes      Comment: 1 drink every couple of weeks       Reviewed and updated as needed this visit by Provider       ROS no fevers no dysuria  This document serves as a record of the services and decisions personally performed and made by Epi Beebe MD. It was created on his behalf by Norma Salgdao, a trained medical scribe.  The creation of this document is based on the scribe's personal observations and the provider's statements to the medical scribe.  Norma Salgado, December 22, 2017 10:38 AM    OBJECTIVE:     /88 (BP Location: Right arm, Patient Position: Chair, Cuff Size: Adult Large)  Pulse 78  Temp 98  F (36.7  C) (Oral)  Resp 16  Ht 1.829 m (6')  Wt 108 kg (238 lb)  SpO2 94%  BMI 32.28 kg/m2  Body mass index is 32.28 kg/(m^2).    Exam  ENT: posterior pharyngeal lymphoid streaks.  CHEST: Clear to auscultation, respirations unlabored      ASSESSMENT/PLAN:   ASSESSMENT / PLAN:  (J34.89) Coryza  (primary encounter diagnosis)  Comment: Now prolonged  Plan: Viral versus vasomotor rhinitis    (J32.9) Sinusitis, unspecified chronicity, unspecified location by definition  Comment: Treat symptoms  Plan: fluticasone (FLONASE) 50 MCG/ACT spray,         amoxicillin-clavulanate (AUGMENTIN) 500-125 MG         per tablet    (N20.0) Calcium oxalate stone in  urine  Comment: Parathyroid studies are futured. Hypercalcemia is mentioned in the chart  Evaluate    (N20.0) Kidney stone  Comment: Parathyroid studies are futured. Hypercalcemia is mentioned in the chart  Evaluate      (E83.52) Serum calcium elevated  Lab Results   Component Value Date    PALOMA 10.2 08/31/2017     Plan: Comprehensive metabolic panel, Calcium ionized,        Vitamin D Deficiency    The information in this document, created by the medical scribe for me, accurately reflects the services I personally performed and the decisions made by me. I have reviewed and approved this document for accuracy prior to leaving the patient care area.  Epi Beebe MD December 22, 2017 10:38 AM    Epi Beebe MD  [facility]

## 2017-12-22 NOTE — MR AVS SNAPSHOT
"              After Visit Summary   2017    Jak Villalpando    MRN: [id number]           Patient Information     Date Of Birth          1970        Visit Information        Provider Department      2017 10:15 AM Epi Beebe MD [facility]        Today's Diagnoses     Coryza    -  1    Sinusitis, unspecified chronicity, unspecified location        Calcium oxalate stone in urine        Kidney stone        Serum calcium elevated           Follow-ups after your visit        Who to contact     If you have questions or need follow up information about today's clinic visit or your schedule please contact [facility] directly at [phone].  Normal or non-critical lab and imaging results will be communicated to you by MyChart, letter or phone within 4 business days after the clinic has received the results. If you do not hear from us within 7 days, please contact the clinic through [employer] or phone. If you have a critical or abnormal lab result, we will notify you by phone as soon as possible.  Submit refill requests through [employer] or call your pharmacy and they will forward the refill request to us. Please allow 3 business days for your refill to be completed.          Additional Information About Your Visit        MyChart Information     [employer] lets you send messages to your doctor, view your test results, renew your prescriptions, schedule appointments and more. To sign up, go to [email or web address] . Click on \"Log in\" on the left side of the screen, which will take you to the Welcome page. Then click on \"Sign up Now\" on the right side of the page.     You will be asked to enter the access code listed below, as well as some personal information. Please follow the directions to create your username and password.     Your access code is: [email or web address]  Expires: 3/22/2018 10:49 AM     Your access code will  in 90 days. If you need help or a new code, " please call your [facility] clinic or [phone].        Care EveryWhere ID     This is your Care EveryWhere ID. This could be used by other organizations to access your [facility] medical records  [id number]        Your Vitals Were     Pulse Temperature Respirations Height Pulse Oximetry BMI (Body Mass Index)    78 98  F (36.7  C) (Oral) 16 6' (1.829 m) 94% 32.28 kg/m2       Blood Pressure from Last 3 Encounters:   12/22/17 127/88   09/21/17 132/52   09/08/17 110/90    Weight from Last 3 Encounters:   12/22/17 238 lb (108 kg)   09/20/17 235 lb 8 oz (106.8 kg)   09/08/17 241 lb (109.3 kg)              We Performed the Following     **Parathyroid Hormone Intact FUTURE 2mo     Calcium ionized     Comprehensive metabolic panel     Vitamin D Deficiency          Today's Medication Changes          These changes are accurate as of: 12/22/17  4:02 PM.  If you have any questions, ask your nurse or doctor.               Start taking these medicines.        Dose/Directions    amoxicillin-clavulanate 500-125 MG per tablet   Commonly known as:  AUGMENTIN   Used for:  Sinusitis, unspecified chronicity, unspecified location   Started by:  Epi Beebe MD        Dose:  1 tablet   Take 1 tablet by mouth 3 times daily   Quantity:  30 tablet   Refills:  0       fluticasone 50 MCG/ACT spray   Commonly known as:  FLONASE   Used for:  Sinusitis, unspecified chronicity, unspecified location   Started by:  Epi Beebe MD        Dose:  1-2 spray   Spray 1-2 sprays into both nostrils daily   Quantity:  1 Bottle   Refills:  11            Where to get your medicines      These medications were sent to [id number] - [place], [place] - [address]  [address], [facility] [address]     Phone:  [phone]     amoxicillin-clavulanate 500-125 MG per tablet    fluticasone 50 MCG/ACT spray                Primary Care Provider Office Phone # Fax #    Heide Maciel [phone]       [address]   [address]   [facility] [address]        Equal Access to Services     CHACHAOPAL LEOBARDO : Hadii josé miguel riley angelasocorro Sobrennen, waaxda luqadaha, qaybta kaemirda marionbarbdeb, waxlamonte cesia silvestretieshajanessa rivera. So [facility] [phone].    ATENCIÓN: Si habla español, tiene a sellers disposición servicios gratuitos de asistencia lingüística. Llame al [phone].    We comply with applicable federal civil rights laws and [place] laws. We do not discriminate on the basis of race, color, national origin, age, disability, sex, sexual orientation, or gender identity.            Thank you!     Thank you for choosing [facility]  for your care. Our goal is always to provide you with excellent care. Hearing back from our patients is one way we can continue to improve our services. Please take a few minutes to complete the written survey that you may receive in the mail after your visit with us. Thank you!             Your Updated Medication List - Protect others around you: Learn how to safely use, store and throw away your medicines at www.disposemymeds.org.          This list is accurate as of: 12/22/17  4:02 PM.  Always use your most recent med list.                   Brand Name Dispense Instructions for use Diagnosis    amoxicillin-clavulanate 500-125 MG per tablet    AUGMENTIN    30 tablet    Take 1 tablet by mouth 3 times daily    Sinusitis, unspecified chronicity, unspecified location       fluticasone 50 MCG/ACT spray    FLONASE    1 Bottle    Spray 1-2 sprays into both nostrils daily    Sinusitis, unspecified chronicity, unspecified location

## 2017-12-23 LAB
ALBUMIN SERPL-MCNC: 4.3 G/DL (ref 3.4–5)
ALP SERPL-CCNC: 59 U/L (ref 40–150)
ALT SERPL W P-5'-P-CCNC: 30 U/L (ref 0–70)
ANION GAP SERPL CALCULATED.3IONS-SCNC: 10 MMOL/L (ref 3–14)
AST SERPL W P-5'-P-CCNC: 19 U/L (ref 0–45)
BILIRUB SERPL-MCNC: 0.5 MG/DL (ref 0.2–1.3)
BUN SERPL-MCNC: 16 MG/DL (ref 7–30)
CALCIUM SERPL-MCNC: 9 MG/DL (ref 8.5–10.1)
CHLORIDE SERPL-SCNC: 105 MMOL/L (ref 94–109)
CO2 SERPL-SCNC: 24 MMOL/L (ref 20–32)
CREAT SERPL-MCNC: 0.96 MG/DL (ref 0.66–1.25)
DEPRECATED CALCIDIOL+CALCIFEROL SERPL-MC: 18 UG/L (ref 20–75)
GFR SERPL CREATININE-BSD FRML MDRD: 83 ML/MIN/1.7M2
GLUCOSE SERPL-MCNC: 112 MG/DL (ref 70–99)
POTASSIUM SERPL-SCNC: 4.6 MMOL/L (ref 3.4–5.3)
PROT SERPL-MCNC: 7 G/DL (ref 6.8–8.8)
SODIUM SERPL-SCNC: 139 MMOL/L (ref 133–144)

## 2017-12-24 NOTE — PROGRESS NOTES
Looks like you need some vitamin D, and are flirting with diabetes. Take 1000 Units of vitamin D daily. That's over the counter.  Check for diabetes at least yearly  DANIE LUNDY

## 2018-01-22 ENCOUNTER — RADIANT APPOINTMENT (OUTPATIENT)
Dept: GENERAL RADIOLOGY | Facility: CLINIC | Age: 48
End: 2018-01-22
Attending: NURSE PRACTITIONER
Payer: COMMERCIAL

## 2018-01-22 ENCOUNTER — OFFICE VISIT (OUTPATIENT)
Dept: FAMILY MEDICINE | Facility: CLINIC | Age: 48
End: 2018-01-22
Payer: COMMERCIAL

## 2018-01-22 VITALS
RESPIRATION RATE: 16 BRPM | BODY MASS INDEX: 31.54 KG/M2 | SYSTOLIC BLOOD PRESSURE: 128 MMHG | TEMPERATURE: 98.6 F | WEIGHT: 238 LBS | HEIGHT: 73 IN | OXYGEN SATURATION: 95 % | DIASTOLIC BLOOD PRESSURE: 86 MMHG | HEART RATE: 90 BPM

## 2018-01-22 DIAGNOSIS — M54.6 ACUTE RIGHT-SIDED THORACIC BACK PAIN: ICD-10-CM

## 2018-01-22 DIAGNOSIS — M54.6 ACUTE RIGHT-SIDED THORACIC BACK PAIN: Primary | ICD-10-CM

## 2018-01-22 PROCEDURE — 72070 X-RAY EXAM THORAC SPINE 2VWS: CPT | Mod: FY

## 2018-01-22 PROCEDURE — 99213 OFFICE O/P EST LOW 20 MIN: CPT | Performed by: NURSE PRACTITIONER

## 2018-01-22 RX ORDER — CYCLOBENZAPRINE HCL 5 MG
5 TABLET ORAL 3 TIMES DAILY PRN
Qty: 30 TABLET | Refills: 0 | Status: SHIPPED | OUTPATIENT
Start: 2018-01-22 | End: 2018-09-11

## 2018-01-22 NOTE — PROGRESS NOTES
"  SUBJECTIVE:   Jak Villalpando is a 47 year old male who presents to clinic today for the following health issues:      Joint Pain    Onset: A week Ago    Description:   Location: Right Ribs   Character: Dull ache and Throbbing     Intensity: 8/10    Progression of Symptoms: worse    Accompanying Signs & Symptoms:  Other symptoms: swelling    History:   Previous similar pain: YES- Pt Gets Back Pain      Precipitating factors:   Trauma or overuse: YES- Extended to arm Forward then felt pain in the ribs.     Alleviating factors:  Improved by: rest/inactivity    Therapies Tried and outcome: Aleve did not help. Rest Helps a Little Bit.     Pain comes and goes.  The more active he is the worse the pain is.  Depending on how he moves his R arm pain will increase.  Pain is on the R side of the middle of his back.  Five years had a compression fracture in T8 after a tree fell on him.  Sees his chiro every 2 weeks.  No SOB.  Has had a cough/cold that he is getting over.          Problem list and histories reviewed & adjusted, as indicated.  Additional history: as documented    Current Outpatient Prescriptions   Medication Sig Dispense Refill     fluticasone (FLONASE) 50 MCG/ACT spray Spray 1-2 sprays into both nostrils daily 1 Bottle 11     Allergies   Allergen Reactions     Ibuprofen Sodium Hives     ibuprofen       Reviewed and updated as needed this visit by clinical staffTobacco  Allergies  Meds  Med Hx  Surg Hx  Fam Hx  Soc Hx      Reviewed and updated as needed this visit by Provider         ROS:  Constitutional, HEENT, cardiovascular, pulmonary, gi and gu systems are negative, except as otherwise noted.    OBJECTIVE:   /86 (BP Location: Right arm, Patient Position: Sitting, Cuff Size: Adult Large)  Pulse 90  Temp 98.6  F (37  C) (Oral)  Resp 16  Ht 6' 1\" (1.854 m)  Wt 238 lb (108 kg)  SpO2 95%  BMI 31.4 kg/m2  Body mass index is 31.4 kg/(m^2).  GENERAL: healthy, alert and no distress  RESP: lungs " clear to auscultation - no rales, rhonchi or wheezes  CV: regular rate and rhythm, normal S1 S2, no S3 or S4, no murmur, click or rub, no peripheral edema and peripheral pulses strong  MS: back: no spinous tenderness, tender to palpation along the paraspinal muscles in the midthoracic region on the R side, upper extremity strength normal  SKIN: no suspicious lesions or rashes    Diagnostic Test Results:  Xray - pending radiology read    ASSESSMENT/PLAN:   1. Acute right-sided thoracic back pain  Suspect this is more muscular pain.  Rest, heat and flexeril.  Hives with ibuprofen.  Has been taking aleve without a problem.  Can alternate tylenol with aleve.    - XR Thoracic Spine 2 Views; Future  - cyclobenzaprine (FLEXERIL) 5 MG tablet; Take 1 tablet (5 mg) by mouth 3 times daily as needed for muscle spasms  Dispense: 30 tablet; Refill: 0    F/u as needed if no improvement or worsening symptoms     DANISHA Macias CNP  [facility]

## 2018-01-22 NOTE — MR AVS SNAPSHOT
"              After Visit Summary   2018    Jak Villalpando    MRN: [id number]           Patient Information     Date Of Birth          1970        Visit Information        Provider Department      2018 10:40 AM Sussy Chavira APRN CNP [facility]        Today's Diagnoses     Acute right-sided thoracic back pain    -  1      Care Instructions    Rest, heat, tylenol as needed.            Follow-ups after your visit        Who to contact     If you have questions or need follow up information about today's clinic visit or your schedule please contact [facility] directly at [phone].  Normal or non-critical lab and imaging results will be communicated to you by [employer], letter or phone within 4 business days after the clinic has received the results. If you do not hear from us within 7 days, please contact the clinic through [employer] or phone. If you have a critical or abnormal lab result, we will notify you by phone as soon as possible.  Submit refill requests through [employer] or call your pharmacy and they will forward the refill request to us. Please allow 3 business days for your refill to be completed.          Additional Information About Your Visit        MyChart Information     [employer] lets you send messages to your doctor, view your test results, renew your prescriptions, schedule appointments and more. To sign up, go to [email or web address] . Click on \"Log in\" on the left side of the screen, which will take you to the Welcome page. Then click on \"Sign up Now\" on the right side of the page.     You will be asked to enter the access code listed below, as well as some personal information. Please follow the directions to create your username and password.     Your access code is: [email or web address]  Expires: 3/22/2018 10:49 AM     Your access code will  in 90 days. If you need help or a new code, please call your [facility] or [phone].      " "  Care EveryWhere ID     This is your Care EveryWhere ID. This could be used by other organizations to access your [facility] medical records  [id number]        Your Vitals Were     Pulse Temperature Respirations Height Pulse Oximetry BMI (Body Mass Index)    90 98.6  F (37  C) (Oral) 16 6' 1\" (1.854 m) 95% 31.4 kg/m2       Blood Pressure from Last 3 Encounters:   01/22/18 128/86   12/22/17 127/88   09/21/17 132/52    Weight from Last 3 Encounters:   01/22/18 238 lb (108 kg)   12/22/17 238 lb (108 kg)   09/20/17 235 lb 8 oz (106.8 kg)                 Today's Medication Changes          These changes are accurate as of: 1/22/18 11:34 AM.  If you have any questions, ask your nurse or doctor.               Start taking these medicines.        Dose/Directions    cyclobenzaprine 5 MG tablet   Commonly known as:  FLEXERIL   Used for:  Acute right-sided thoracic back pain   Started by:  Sussy Chavira APRN CNP        Dose:  5 mg   Take 1 tablet (5 mg) by mouth 3 times daily as needed for muscle spasms   Quantity:  30 tablet   Refills:  0            Where to get your medicines      These medications were sent to [id number] - [place], [place] - [address]  [address] [facility] , [facility] [address]     Phone:  [phone]     cyclobenzaprine 5 MG tablet                Primary Care Provider Office Phone # Fax #    Heide Maciel [phone]       [address]   [facility] [address]        Equal Access to Services     [facility] AH: Hadii josé miguel riley hadasho Soomaali, waaxda luqadaha, qaybta kaalmada alyx coates. So [facility] [phone].    ATENCIÓN: Si habla español, tiene a sellers disposición servicios gratuitos de asistencia lingüística. Llame al [phone].    We comply with applicable federal civil rights laws and [place] laws. We do not discriminate on the basis of race, color, national origin, age, disability, sex, sexual " orientation, or gender identity.            Thank you!     Thank you for choosing [facility]  for your care. Our goal is always to provide you with excellent care. Hearing back from our patients is one way we can continue to improve our services. Please take a few minutes to complete the written survey that you may receive in the mail after your visit with us. Thank you!             Your Updated Medication List - Protect others around you: Learn how to safely use, store and throw away your medicines at www.disposemymeds.org.          This list is accurate as of: 1/22/18 11:34 AM.  Always use your most recent med list.                   Brand Name Dispense Instructions for use Diagnosis    cyclobenzaprine 5 MG tablet    FLEXERIL    30 tablet    Take 1 tablet (5 mg) by mouth 3 times daily as needed for muscle spasms    Acute right-sided thoracic back pain       fluticasone 50 MCG/ACT spray    FLONASE    1 Bottle    Spray 1-2 sprays into both nostrils daily    Sinusitis, unspecified chronicity, unspecified location

## 2018-01-23 ENCOUNTER — OFFICE VISIT (OUTPATIENT)
Dept: FAMILY MEDICINE | Facility: CLINIC | Age: 48
End: 2018-01-23
Payer: COMMERCIAL

## 2018-01-23 VITALS
HEART RATE: 84 BPM | RESPIRATION RATE: 18 BRPM | TEMPERATURE: 98.1 F | SYSTOLIC BLOOD PRESSURE: 122 MMHG | DIASTOLIC BLOOD PRESSURE: 88 MMHG | OXYGEN SATURATION: 95 %

## 2018-01-23 DIAGNOSIS — J06.9 VIRAL URI: Primary | ICD-10-CM

## 2018-01-23 DIAGNOSIS — R05.9 COUGH: ICD-10-CM

## 2018-01-23 LAB
FLUAV+FLUBV AG SPEC QL: NEGATIVE
FLUAV+FLUBV AG SPEC QL: NEGATIVE
SPECIMEN SOURCE: NORMAL

## 2018-01-23 PROCEDURE — 99213 OFFICE O/P EST LOW 20 MIN: CPT | Performed by: NURSE PRACTITIONER

## 2018-01-23 PROCEDURE — 87804 INFLUENZA ASSAY W/OPTIC: CPT | Performed by: NURSE PRACTITIONER

## 2018-01-23 RX ORDER — OSELTAMIVIR PHOSPHATE 75 MG/1
75 CAPSULE ORAL 2 TIMES DAILY
Qty: 10 CAPSULE | Refills: 0 | Status: SHIPPED | OUTPATIENT
Start: 2018-01-23 | End: 2018-09-11

## 2018-01-23 NOTE — MR AVS SNAPSHOT
After Visit Summary   1/23/2018    Jak Villalpando    MRN: [id number]           Patient Information     Date Of Birth          1970        Visit Information        Provider Department      1/23/2018 11:20 AM Sussy Chavira APRN [facility]        Today's Diagnoses     Cough    -  1    Viral URI          Care Instructions      Viral Upper Respiratory Illness (Adult)  You have a viral upper respiratory illness (URI), which is another term for the common cold. This illness is contagious during the first few days. It is spread through the air by coughing and sneezing. It may also be spread by direct contact (touching the sick person and then touching your own eyes, nose, or mouth). Frequent handwashing will decrease risk of spread. Most viral illnesses go away within 7 to 10 days with rest and simple home remedies. Sometimes the illness may last for several weeks. Antibiotics will not kill a virus, and they are generally not prescribed for this condition.    Home care    If symptoms are severe, rest at home for the first 2 to 3 days. When you resume activity, don't let yourself get too tired.    Avoid being exposed to cigarette smoke (yours or others ).    You may use acetaminophen or ibuprofen to control pain and fever, unless another medicine was prescribed. (Note: If you have chronic liver or kidney disease, have ever had a stomach ulcer or gastrointestinal bleeding, or are taking blood-thinning medicines, talk with your healthcare provider before using these medicines.) Aspirin should never be given to anyone under 18 years of age who is ill with a viral infection or fever. It may cause severe liver or brain damage.    Your appetite may be poor, so a light diet is fine. Avoid dehydration by drinking 6 to 8 glasses of fluids per day (water, soft drinks, juices, tea, or soup). Extra fluids will help loosen secretions in the nose and lungs.    Over-the-counter cold  medicines will not shorten the length of time you re sick, but they may be helpful for the following symptoms: cough, sore throat, and nasal and sinus congestion. (Note: Do not use decongestants if you have high blood pressure.)  Follow-up care  Follow up with your healthcare provider, or as advised.  When to seek medical advice  Call your healthcare provider right away if any of these occur:    Cough with lots of colored sputum (mucus)    Severe headache; face, neck, or ear pain    Difficulty swallowing due to throat pain    Fever of 100.4 F (38 C)  Call 911, or get immediate medical care  Call emergency services right away if any of these occur:    Chest pain, shortness of breath, wheezing, or difficulty breathing    Coughing up blood    Inability to swallow due to throat pain  Date Last Reviewed: 9/13/2015 2000-2017 [employer]. [address] [place] [address]. All rights reserved. This information is not intended as a substitute for professional medical care. Always follow your healthcare professional's instructions.                Follow-ups after your visit        Follow-up notes from your care team     Return if symptoms worsen or fail to improve.      Who to contact     If you have questions or need follow up information about today's clinic visit or your schedule please contact [facility] directly at [phone].  Normal or non-critical lab and imaging results will be communicated to you by [employer], letter or phone within 4 business days after the clinic has received the results. If you do not hear from us within 7 days, please contact the clinic through MyChart or phone. If you have a critical or abnormal lab result, we will notify you by phone as soon as possible.  Submit refill requests through [employer] or call your pharmacy and they will forward the refill request to us. Please allow 3 business days for your refill to be completed.          Additional  "Information About Your Visit        MyChart Information     [facility] lets you send messages to your doctor, view your test results, renew your prescriptions, schedule appointments and more. To sign up, go to [email or web address] . Click on \"Log in\" on the left side of the screen, which will take you to the Welcome page. Then click on \"Sign up Now\" on the right side of the page.     You will be asked to enter the access code listed below, as well as some personal information. Please follow the directions to create your username and password.     Your access code is: [email or web address]  Expires: 3/22/2018 10:49 AM     Your access code will  in 90 days. If you need help or a new code, please call your [facility] clinic or [phone].        Care EveryWhere ID     This is your Care EveryWhere ID. This could be used by other organizations to access your [facility] medical records  [id number]        Your Vitals Were     Pulse Temperature Respirations Pulse Oximetry          84 98.1  F (36.7  C) (Oral) 18 95%         Blood Pressure from Last 3 Encounters:   18 122/88   18 128/86   17 127/88    Weight from Last 3 Encounters:   18 238 lb (108 kg)   17 238 lb (108 kg)   17 235 lb 8 oz (106.8 kg)              We Performed the Following     Influenza A/B antigen          Today's Medication Changes          These changes are accurate as of: 18 12:26 PM.  If you have any questions, ask your nurse or doctor.               Start taking these medicines.        Dose/Directions    oseltamivir 75 MG capsule   Commonly known as:  TAMIFLU   Used for:  Viral URI   Started by:  Sussy Chavira APRN CNP        Dose:  75 mg   Take 1 capsule (75 mg) by mouth 2 times daily   Quantity:  10 capsule   Refills:  0            Where to get your medicines      These medications were sent to [facility]/pharmacy #[id number] - [place], [place] - [address]  [address] [facility] [address]     Phone:  " [phone]     oseltamivir 75 MG capsule                Primary Care Provider Office Phone # Fax #    Heide Fiordaliza Maciel [phone]       [id number]  [address]   [facility] [address]        Equal Access to Services     YELENA BOOTH : Hadii aad ku hadasho Soomaali, waaxda luqadaha, qaybta kaalmada adeegyada, waxlamonte callaway hayramonan marion marysejanessa rivera. So [facility] [phone].    ATENCIÓN: Si habla español, tiene a sellers disposición servicios gratuitos de asistencia lingüística. Llame al [phone].    We comply with applicable federal civil rights laws and [place] laws. We do not discriminate on the basis of race, color, national origin, age, disability, sex, sexual orientation, or gender identity.            Thank you!     Thank you for choosing [facility]  for your care. Our goal is always to provide you with excellent care. Hearing back from our patients is one way we can continue to improve our services. Please take a few minutes to complete the written survey that you may receive in the mail after your visit with us. Thank you!             Your Updated Medication List - Protect others around you: Learn how to safely use, store and throw away your medicines at www.disposemymeds.org.          This list is accurate as of: 1/23/18 12:26 PM.  Always use your most recent med list.                   Brand Name Dispense Instructions for use Diagnosis    cyclobenzaprine 5 MG tablet    FLEXERIL    30 tablet    Take 1 tablet (5 mg) by mouth 3 times daily as needed for muscle spasms    Acute right-sided thoracic back pain       fluticasone 50 MCG/ACT spray    FLONASE    1 Bottle    Spray 1-2 sprays into both nostrils daily    Sinusitis, unspecified chronicity, unspecified location       oseltamivir 75 MG capsule    TAMIFLU    10 capsule    Take 1 capsule (75 mg) by mouth 2 times daily    Viral URI

## 2018-01-23 NOTE — PROGRESS NOTES
HPI    SUBJECTIVE:   Jak Villalpando is a 47 year old male who presents to clinic today for the following health issues:      Acute Illness   Acute illness concerns: cough, head feels foggy. Malaise.   Onset: This Morning    Fever: no     Chills/Sweats: YES- Both    Headache (location?): no     Sinus Pressure:YES    Conjunctivitis:  no    Ear Pain: no    Rhinorrhea: no     Congestion: YES    Sore Throat: no      Cough: YES-non-productive, infrequent     Wheeze: YES    Decreased Appetite: YES    Nausea: no     Vomiting: no     Diarrhea:  YES    Dysuria/Freq.: no     Fatigue/Achiness: YES- Both    Sick/Strep Exposure: no      Therapies Tried and outcome: Flonase helped congestion. Rest has helped as well.     Woke up this morning feeling fine.  Around 7:00 am started to feel sick.  No flu vaccine this year.  Was seen in the clinic yesterday for back pain.  This is unrelated to back pain.          Problem list and histories reviewed & adjusted, as indicated.  Additional history: as documented    Current Outpatient Prescriptions   Medication Sig Dispense Refill     cyclobenzaprine (FLEXERIL) 5 MG tablet Take 1 tablet (5 mg) by mouth 3 times daily as needed for muscle spasms 30 tablet 0     fluticasone (FLONASE) 50 MCG/ACT spray Spray 1-2 sprays into both nostrils daily 1 Bottle 11     Allergies   Allergen Reactions     Ibuprofen Sodium Hives     ibuprofen       Reviewed and updated as needed this visit by clinical staffTobacco  Allergies  Meds  Med Hx  Surg Hx  Fam Hx  Soc Hx      Reviewed and updated as needed this visit by Provider         ROS:  Constitutional, HEENT, cardiovascular, pulmonary, gi and gu systems are negative, except as otherwise noted.    OBJECTIVE:   /88 (BP Location: Right arm, Patient Position: Sitting, Cuff Size: Adult Large)  Pulse 84  Temp 98.1  F (36.7  C) (Oral)  Resp 18  SpO2 95%  There is no height or weight on file to calculate BMI.  GENERAL: healthy, alert and no  distress  HENT: ear canals and TM's normal, nose and mouth without ulcers or lesions  NECK: no adenopathy, no asymmetry, masses, or scars and thyroid normal to palpation  RESP: lungs clear to auscultation - no rales, rhonchi or wheezes, normal effort  CV: regular rate and rhythm, normal S1 S2, no S3 or S4, no murmur, click or rub  SKIN: no suspicious lesions or rashes    Diagnostic Test Results:  Results for orders placed or performed in visit on 01/23/18   Influenza A/B antigen   Result Value Ref Range    Influenza A/B Agn Specimen Nasal     Influenza A Negative NEG^Negative    Influenza B Negative NEG^Negative         ASSESSMENT/PLAN:   1. Cough  - Influenza A/B antigen    2. Viral URI  Symptoms suggestive of influenza; given onset of illness will treat with tamiflu.  SE's discussed.  Ensure plenty of rest, fluids, cough syrup and decongestants as needed.  Tylenol as needed for fever/discomfort.  - oseltamivir (TAMIFLU) 75 MG capsule; Take 1 capsule (75 mg) by mouth 2 times daily  Dispense: 10 capsule; Refill: 0    F/u as needed if no improvement in 1 week; sooner if worsening symptoms     DANISHA Macias [facility]      Physical Exam

## 2018-01-23 NOTE — PATIENT INSTRUCTIONS

## 2018-09-11 ENCOUNTER — OFFICE VISIT (OUTPATIENT)
Dept: FAMILY MEDICINE | Facility: CLINIC | Age: 48
End: 2018-09-11
Payer: COMMERCIAL

## 2018-09-11 VITALS
TEMPERATURE: 98.4 F | SYSTOLIC BLOOD PRESSURE: 106 MMHG | BODY MASS INDEX: 32.32 KG/M2 | DIASTOLIC BLOOD PRESSURE: 70 MMHG | WEIGHT: 245 LBS | RESPIRATION RATE: 16 BRPM | HEART RATE: 96 BPM

## 2018-09-11 DIAGNOSIS — K21.9 GASTROESOPHAGEAL REFLUX DISEASE, ESOPHAGITIS PRESENCE NOT SPECIFIED: ICD-10-CM

## 2018-09-11 DIAGNOSIS — N50.811 TESTICULAR PAIN, RIGHT: Primary | ICD-10-CM

## 2018-09-11 DIAGNOSIS — N20.0 CALCULUS OF KIDNEY: ICD-10-CM

## 2018-09-11 LAB
ALBUMIN UR-MCNC: NEGATIVE MG/DL
APPEARANCE UR: CLEAR
BILIRUB UR QL STRIP: NEGATIVE
COLOR UR AUTO: YELLOW
GLUCOSE UR STRIP-MCNC: NEGATIVE MG/DL
HGB UR QL STRIP: NEGATIVE
KETONES UR STRIP-MCNC: NEGATIVE MG/DL
LEUKOCYTE ESTERASE UR QL STRIP: NEGATIVE
NITRATE UR QL: NEGATIVE
PH UR STRIP: 5.5 PH (ref 5–7)
SOURCE: NORMAL
SP GR UR STRIP: 1.02 (ref 1–1.03)
UROBILINOGEN UR STRIP-ACNC: 0.2 EU/DL (ref 0.2–1)

## 2018-09-11 PROCEDURE — 99214 OFFICE O/P EST MOD 30 MIN: CPT | Performed by: PHYSICIAN ASSISTANT

## 2018-09-11 PROCEDURE — 81003 URINALYSIS AUTO W/O SCOPE: CPT | Performed by: PHYSICIAN ASSISTANT

## 2018-09-11 RX ORDER — OXYCODONE AND ACETAMINOPHEN 5; 325 MG/1; MG/1
1 TABLET ORAL EVERY 6 HOURS PRN
Qty: 15 TABLET | Refills: 0 | Status: SHIPPED | OUTPATIENT
Start: 2018-09-11 | End: 2019-02-25

## 2018-09-11 RX ORDER — TAMSULOSIN HYDROCHLORIDE 0.4 MG/1
0.4 CAPSULE ORAL DAILY
Qty: 30 CAPSULE | Refills: 0 | Status: SHIPPED | OUTPATIENT
Start: 2018-09-11 | End: 2018-10-08

## 2018-09-11 NOTE — PROGRESS NOTES
SUBJECTIVE:   Jak Villalpando is a 48 year old male who presents to clinic today for the following health issues:      Genitourinary - Male  Onset: today    Description:   Dysuria (painful urination): no   Hematuria (blood in urine): no   Frequency: no   Are you urinating at night : no   Hesitancy (delay in urine): no   Retention (unable to empty): no   Decrease in urinary flow: no   Incontinence: no     Progression of Symptoms:  worsening and intermittent    Accompanying Signs & Symptoms:  Fever: no   Back/Flank pain: no   Urethral discharge: no   Testicle lumps/masses/pain: YES  Nausea and/or vomiting: no   Abdominal pain: no     History:   History of frequent UTI's: no   History of kidney stones: YES  History of hernias: YES  Personal or Family history of Prostate problems: no  Sexually active: no     Precipitating factors:   Recently changed diet    Alleviating factors:  ?    Symptoms since 9am today.  Right sided testicular pain, radiates from groin to testicle.  No hematuria.  No dysuria noted.  Not having flank pain.  Went on a diet recently, stopped with creamer in coffee trying to eat healthier in general but complains that he actually gained weight.  Does not know if this could have started his symptoms this time. Takes a lot of TUMs for heart burn but this may be elevating his calcium.  Has tried zantac also.  Has a history of ongoing problems with renal calculi and two surgeries since last seen in clinic which was about a year ago for this problem.  Usually Flomax and pain medication are the treatment needed.        Problem list and histories reviewed & adjusted, as indicated.  Additional history: as documented    Labs reviewed in EPIC    Reviewed and updated as needed this visit by clinical staff  Tobacco  Allergies  Meds  Med Hx  Surg Hx  Fam Hx  Soc Hx      Reviewed and updated as needed this visit by Provider         ROS:  Constitutional, HEENT, cardiovascular, pulmonary, gi and gu systems  are negative, except as otherwise noted.    OBJECTIVE:     /70 (BP Location: Right arm, Patient Position: Sitting, Cuff Size: Adult Large)  Pulse 96  Temp 98.4  F (36.9  C) (Oral)  Resp 16  Wt 245 lb (111.1 kg)  BMI 32.32 kg/m2  Body mass index is 32.32 kg/(m^2).  GENERAL: healthy, alert and no distress  ABDOMEN: soft, nontender, no hepatosplenomegaly, no masses and bowel sounds normal   (male): normal male genitalia without lesions or urethral discharge, no hernia  MS: no gross musculoskeletal defects noted, no edema  SKIN: no suspicious lesions or rashes  BACK: no CVA tenderness, no paralumbar tenderness  PSYCH: mentation appears normal, affect normal/bright    Diagnostic Test Results:  Results for orders placed or performed in visit on 09/11/18   *UA reflex to Microscopic and Culture ([facility] (except [place] and [place])   Result Value Ref Range    Color Urine Yellow     Appearance Urine Clear     Glucose Urine Negative NEG^Negative mg/dL    Bilirubin Urine Negative NEG^Negative    Ketones Urine Negative NEG^Negative mg/dL    Specific Gravity Urine 1.025 1.003 - 1.035    Blood Urine Negative NEG^Negative    pH Urine 5.5 5.0 - 7.0 pH    Protein Albumin Urine Negative NEG^Negative mg/dL    Urobilinogen Urine 0.2 0.2 - 1.0 EU/dL    Nitrite Urine Negative NEG^Negative    Leukocyte Esterase Urine Negative NEG^Negative    Source Midstream Urine        ASSESSMENT/PLAN:   1. Testicular pain, right  Urine is normal today but will treat given history and his normal pattern of symptoms.  Follow up if not improving as expected.  - *UA reflex to Microscopic and Culture ([facility] (except [place] and [place])  - tamsulosin (FLOMAX) 0.4 MG capsule; Take 1 capsule (0.4 mg) by mouth daily  Dispense: 30 capsule; Refill: 0    2. Gastroesophageal reflux disease, esophagitis presence not specified  Refilled.  Patient requested.  - omeprazole (PRILOSEC) 20 MG CR capsule; Take 1  capsule (20 mg) by mouth daily  Dispense: 30 capsule; Refill: 1    3. Calculus of kidney (RENAL)  - as above.  Small amt of pain medication given today.  - tamsulosin (FLOMAX) 0.4 MG capsule; Take 1 capsule (0.4 mg) by mouth daily  Dispense: 30 capsule; Refill: 0  - oxyCODONE-acetaminophen (PERCOCET) 5-325 MG per tablet; Take 1 tablet by mouth every 6 hours as needed for pain  Dispense: 15 tablet; Refill: 0      Will need to follow up with Urology for any ongoing problems.        Bladimir Rushing PA-C  [facility]

## 2018-09-11 NOTE — MR AVS SNAPSHOT
"              After Visit Summary   9/11/2018    Jak Villalpando    MRN: [id number]           Patient Information     Date Of Birth          1970        Visit Information        Provider Department      9/11/2018 2:40 PM Bladimir Rushing PA-C [facility]        Today's Diagnoses     Testicular pain, right    -  1    Gastroesophageal reflux disease, esophagitis presence not specified        Calculus of kidney (RENAL)           Follow-ups after your visit        Follow-up notes from your care team     Return in about 3 days (around 9/14/2018) for if symptoms worsen or fail to improve.      Who to contact     If you have questions or need follow up information about today's clinic visit or your schedule please contact [facility] directly at [phone].  Normal or non-critical lab and imaging results will be communicated to you by MyChart, letter or phone within 4 business days after the clinic has received the results. If you do not hear from us within 7 days, please contact the clinic through MyChart or phone. If you have a critical or abnormal lab result, we will notify you by phone as soon as possible.  Submit refill requests through Jelli or call your pharmacy and they will forward the refill request to us. Please allow 3 business days for your refill to be completed.          Additional Information About Your Visit        MyChart Information     Jelli lets you send messages to your doctor, view your test results, renew your prescriptions, schedule appointments and more. To sign up, go to [email or web address] . Click on \"Log in\" on the left side of the screen, which will take you to the Welcome page. Then click on \"Sign up Now\" on the right side of the page.     You will be asked to enter the access code listed below, as well as some personal information. Please follow the directions to create your username and password.     Your access code is: " [email or web address]  Expires: 12/10/2018  3:38 PM     Your access code will  in 90 days. If you need help or a new code, please call your [facility] clinic or [phone].        Care EveryWhere ID     This is your Care EveryWhere ID. This could be used by other organizations to access your [facility] medical records  [id number]        Your Vitals Were     Pulse Temperature Respirations BMI (Body Mass Index)          96 98.4  F (36.9  C) (Oral) 16 32.32 kg/m2         Blood Pressure from Last 3 Encounters:   18 106/70   18 122/88   18 128/86    Weight from Last 3 Encounters:   18 245 lb (111.1 kg)   18 238 lb (108 kg)   17 238 lb (108 kg)              We Performed the Following     *UA reflex to Microscopic and Culture ([place] and [facility] (except [place] and [place])          Today's Medication Changes          These changes are accurate as of 18  3:39 PM.  If you have any questions, ask your nurse or doctor.               Start taking these medicines.        Dose/Directions    omeprazole 20 MG CR capsule   Commonly known as:  priLOSEC   Used for:  Gastroesophageal reflux disease, esophagitis presence not specified   Started by:  Bladimir Rushing PA-C        Dose:  20 mg   Take 1 capsule (20 mg) by mouth daily   Quantity:  30 capsule   Refills:  1       oxyCODONE-acetaminophen 5-325 MG per tablet   Commonly known as:  PERCOCET   Used for:  Calculus of kidney   Started by:  Bladimir Rushing PA-C        Dose:  1 tablet   Take 1 tablet by mouth every 6 hours as needed for pain   Quantity:  15 tablet   Refills:  0       tamsulosin 0.4 MG capsule   Commonly known as:  FLOMAX   Used for:  Testicular pain, right, Calculus of kidney   Started by:  Bladimir Rushing PA-C        Dose:  0.4 mg   Take 1 capsule (0.4 mg) by mouth daily   Quantity:  30 capsule   Refills:  0            Where to get your medicines      These medications were sent to [facility]/pharmacy  #[id number] - [place], [place] - [address]  [id number], [facility] [address]     Phone:  [phone]     omeprazole 20 MG CR capsule    tamsulosin 0.4 MG capsule         Some of these will need a paper prescription and others can be bought over the counter.  Ask your nurse if you have questions.     Bring a paper prescription for each of these medications     oxyCODONE-acetaminophen 5-325 MG per tablet               Information about OPIOIDS     PRESCRIPTION OPIOIDS: WHAT YOU NEED TO KNOW   We gave you an opioid (narcotic) pain medicine. It is important to manage your pain, but opioids are not always the best choice. You should first try all the other options your care team gave you. Take this medicine for as short a time (and as few doses) as possible.    Some activities can increase your pain, such as bandage changes or therapy sessions. It may help to take your pain medicine 30 to 60 minutes before these activities. Reduce your stress by getting enough sleep, working on hobbies you enjoy and practicing relaxation or meditation. Talk to your care team about ways to manage your pain beyond prescription opioids.    These medicines have risks:    DO NOT drive when on new or higher doses of pain medicine. These medicines can affect your alertness and reaction times, and you could be arrested for driving under the influence (DUI). If you need to use opioids long-term, talk to your care team about driving.    DO NOT operate heavy machinery    DO NOT do any other dangerous activities while taking these medicines.    DO NOT drink any alcohol while taking these medicines.     If the opioid prescribed includes acetaminophen, DO NOT take with any other medicines that contain acetaminophen. Read all labels carefully. Look for the word  acetaminophen  or  Tylenol.  Ask your pharmacist if you have questions or are unsure.    You can get addicted to pain medicines, especially if you have a history of addiction  (chemical, alcohol or substance dependence). Talk to your care team about ways to reduce this risk.    All opioids tend to cause constipation. Drink plenty of water and eat foods that have a lot of fiber, such as fruits, vegetables, prune juice, apple juice and high-fiber cereal. Take a laxative (Miralax, milk of magnesia, Colace, Senna) if you don t move your bowels at least every other day. Other side effects include upset stomach, sleepiness, dizziness, throwing up, tolerance (needing more of the medicine to have the same effect), physical dependence and slowed breathing.    Store your pills in a secure place, locked if possible. We will not replace any lost or stolen medicine. If you don t finish your medicine, please throw away (dispose) as directed by your pharmacist. The [place] [employer]ution [employer] has more information about safe disposal: [email or web address]/[email or web address]         Primary Care Provider Office Phone # Fax #    Heide Maciel [phone]       [id number]  [address]   [facility] [address]        Equal Access to Services     YELENA BOOTH : Hadii josé miguel ku hadasho Soomaali, waaxda luqadaha, qaybta kaalmada adeadebayoyada, alyx nolasco . So [facility] [phone].    ATENCIÓN: Si habla español, tiene a sellers disposición servicios gratuitos de asistencia lingüística. Cortezame al [phone].    We comply with applicable federal civil rights laws and [place] laws. We do not discriminate on the basis of race, color, national origin, age, disability, sex, sexual orientation, or gender identity.            Thank you!     Thank you for choosing [facility]  for your care. Our goal is always to provide you with excellent care. Hearing back from our patients is one way we can continue to improve our services. Please take a few minutes to complete the written survey that you may receive in the mail after your  visit with us. Thank you!             Your Updated Medication List - Protect others around you: Learn how to safely use, store and throw away your medicines at www.disposemymeds.org.          This list is accurate as of 9/11/18  3:39 PM.  Always use your most recent med list.                   Brand Name Dispense Instructions for use Diagnosis    fluticasone 50 MCG/ACT spray    FLONASE    1 Bottle    Spray 1-2 sprays into both nostrils daily    Sinusitis, unspecified chronicity, unspecified location       omeprazole 20 MG CR capsule    priLOSEC    30 capsule    Take 1 capsule (20 mg) by mouth daily    Gastroesophageal reflux disease, esophagitis presence not specified       oxyCODONE-acetaminophen 5-325 MG per tablet    PERCOCET    15 tablet    Take 1 tablet by mouth every 6 hours as needed for pain    Calculus of kidney       tamsulosin 0.4 MG capsule    FLOMAX    30 capsule    Take 1 capsule (0.4 mg) by mouth daily    Testicular pain, right, Calculus of kidney

## 2018-12-27 DIAGNOSIS — J32.9 SINUSITIS, UNSPECIFIED CHRONICITY, UNSPECIFIED LOCATION: ICD-10-CM

## 2018-12-28 NOTE — TELEPHONE ENCOUNTER
"Requested Prescriptions   Pending Prescriptions Disp Refills     fluticasone (FLONASE) 50 MCG/ACT nasal spray [Pharmacy Med Name: FLUTICASONE PROP 50 MCG SPRAY]    Last Written Prescription Date:  12/22/17  Last Fill Quantity: 1 bottle,  # refills: 11   Last office visit: 9/11/2018 with prescribing provider:  Сергей   Future Office Visit:     16 mL 11     Sig: PLACE 1-2 SPRAYS INTO BOTH NOSTRILS DAILY    Inhaled Steroids Protocol Passed - 12/27/2018  8:42 PM       Passed - Patient is age 12 or older       Passed - Recent (12 mo) or future (30 days) visit within the authorizing provider's specialty    Patient had office visit in the last 12 months or has a visit in the next 30 days with authorizing provider or within the authorizing provider's specialty.  See \"Patient Info\" tab in inbasket, or \"Choose Columns\" in Meds & Orders section of the refill encounter.                "

## 2018-12-31 RX ORDER — FLUTICASONE PROPIONATE 50 MCG
SPRAY, SUSPENSION (ML) NASAL
Qty: 16 ML | Refills: 11 | Status: SHIPPED | OUTPATIENT
Start: 2018-12-31 | End: 2019-09-24

## 2019-01-16 ENCOUNTER — OFFICE VISIT (OUTPATIENT)
Dept: FAMILY MEDICINE | Facility: CLINIC | Age: 49
End: 2019-01-16
Payer: COMMERCIAL

## 2019-01-16 VITALS
BODY MASS INDEX: 31.81 KG/M2 | WEIGHT: 240 LBS | SYSTOLIC BLOOD PRESSURE: 104 MMHG | HEART RATE: 99 BPM | TEMPERATURE: 98.2 F | HEIGHT: 73 IN | DIASTOLIC BLOOD PRESSURE: 70 MMHG | OXYGEN SATURATION: 97 %

## 2019-01-16 DIAGNOSIS — J20.8 ACUTE VIRAL BRONCHITIS: Primary | ICD-10-CM

## 2019-01-16 DIAGNOSIS — Z12.11 SPECIAL SCREENING FOR MALIGNANT NEOPLASMS, COLON: ICD-10-CM

## 2019-01-16 PROCEDURE — 99213 OFFICE O/P EST LOW 20 MIN: CPT | Performed by: NURSE PRACTITIONER

## 2019-01-16 RX ORDER — BENZONATATE 100 MG/1
100 CAPSULE ORAL 3 TIMES DAILY PRN
Qty: 30 CAPSULE | Refills: 0 | Status: SHIPPED | OUTPATIENT
Start: 2019-01-16 | End: 2019-09-24

## 2019-01-16 RX ORDER — ALBUTEROL SULFATE 90 UG/1
2 AEROSOL, METERED RESPIRATORY (INHALATION) EVERY 6 HOURS
Qty: 1 INHALER | Refills: 0 | Status: SHIPPED | OUTPATIENT
Start: 2019-01-16 | End: 2020-03-18

## 2019-01-16 ASSESSMENT — MIFFLIN-ST. JEOR: SCORE: 2002.51

## 2019-01-16 NOTE — PATIENT INSTRUCTIONS
Patient Education     Viral Bronchitis (Adult)    You have a viral bronchitis. Bronchitis is inflammation and swelling of the lining of the lungs. This is often caused by an infection. Symptoms include a dry, hacking cough that is worse at night. The cough may bring up yellow-green mucus. You may also feel short of breath or wheeze. Other symptoms may include tiredness, chest discomfort, and chills.  Bronchitis that is caused by a virus is not treated with antibiotics. Instead, medicines may be given to help relieve symptoms. Symptoms can last up to 2 weeks, although the cough may last much longer.  This illness is contagious during the first few days and is spread through the air by coughing and sneezing, or by direct contact (touching the sick person and then touching your own eyes, nose, or mouth).  Most viral illnesses resolve within 10 to 14 days with rest and simple home remedies, although they may sometimes last for several weeks.  Home care    If symptoms are severe, rest at home for the first 2 to 3 days. When you go back to your usual activities, don't let yourself get too tired.    Do not smoke. Also avoid being exposed to secondhand smoke.    You may use over-the-counter medicine to control fever or pain, unless another pain medicine was prescribed. If you have chronic liver or kidney disease or have ever had a stomach ulcer or gastrointestinal bleeding, talk with your healthcare provider before using these medicines. Also talk to your provider if you are taking medicine to prevent blood clots. Aspirin should never be given to anyone younger than 18 years of age who is ill with a viral infection or fever. It may cause severe liver or brain damage.    Your appetite may be poor, so a light diet is fine. Avoid dehydration by drinking 6 to 8 glasses of fluids per day (such as water, soft drinks, sports drinks, juices, tea, or soup). Extra fluids will help loosen secretions in the nose and  lungs.    Over-the-counter cough, cold, and sore-throat medicines will not shorten the length of the illness, but they may help to reduce symptoms. Don't use decongestants if you have high blood pressure.  Follow-up care  Follow up with your healthcare provider, or as advised. If you had an X-ray or ECG (electrocardiogram), a specialist will review it. You will be notified of any new findings that may affect your care.  If you are age 65 or older, or if you have a chronic lung disease or condition that affects your immune system, or you smoke, ask your healthcare provider about getting a pneumococcal vaccine and a yearly flu shot (influenza vaccine).  When to seek medical advice  Call your healthcare provider right away if any of these occur:    Fever of 100.4 F (38 C) or higher, or as directed by your healthcare provider    Coughing up increased amounts of colored sputum    Weakness, drowsiness, headache, facial pain, ear pain, or a stiff neck  Call 911  Call 911 if any of these occur:    Coughing up blood    Worsening weakness, drowsiness, headache, or stiff neck    Trouble breathing, wheezing, or pain with breathing  Date Last Reviewed: 6/1/2018 2000-2018 [employer]. [address] [place] [address], [place] [address]. All rights reserved. This information is not intended as a substitute for professional medical care. Always follow your healthcare professional's instructions.

## 2019-02-07 DIAGNOSIS — J20.8 ACUTE VIRAL BRONCHITIS: ICD-10-CM

## 2019-02-07 NOTE — TELEPHONE ENCOUNTER
Tried calling patient to see how he is feeling.  RX was given to patient 1/16/2019 for Acute Viral Bronchitis.  Unable to reach patient.  MAIL BOX IS FULL.    Please advise further.    Aneta Agrawal RN

## 2019-02-07 NOTE — TELEPHONE ENCOUNTER
"Requested Prescriptions   Pending Prescriptions Disp Refills     albuterol (PROAIR HFA/PROVENTIL HFA/VENTOLIN HFA) 108 (90 Base) MCG/ACT inhaler [Pharmacy Med Name: PROAIR HFA 90 MCG INHALER]  Last Written Prescription Date:  1/16/19  Last Fill Quantity: 1 INHALER,  # refills: 0   Last office visit: 1/16/2019 with prescribing provider:  STRONG   Future Office Visit:     8.5 Inhaler 0     Sig: Inhale 2 puffs into the lungs every 6 hours    Asthma Maintenance Inhalers - Anticholinergics Passed - 2/7/2019  8:14 AM       Passed - Patient is age 12 years or older       Passed - Recent (12 mo) or future (30 days) visit within the authorizing provider's specialty    Patient had office visit in the last 12 months or has a visit in the next 30 days with authorizing provider or within the authorizing provider's specialty.  See \"Patient Info\" tab in inbasket, or \"Choose Columns\" in Meds & Orders section of the refill encounter.             Passed - Medication is active on med list          "

## 2019-02-08 RX ORDER — ALBUTEROL SULFATE 90 UG/1
2 AEROSOL, METERED RESPIRATORY (INHALATION) EVERY 6 HOURS
Qty: 8.5 INHALER | Refills: 0 | OUTPATIENT
Start: 2019-02-08 | End: 2020-02-08

## 2019-02-08 NOTE — TELEPHONE ENCOUNTER
Hasn't been a month since he was seen.  If he has used this up and is still not feeling well should be seen for repeat lung exam.    Sussy Chavira CNP

## 2019-02-08 NOTE — TELEPHONE ENCOUNTER
"Spoke with patient.  He states that the pharmacy called him wanting to know if he needed a refill of his medications and he said yes and they probably just went ahead and added the albuterol to the list.  He states that he does not necessarily need the refill but wouldn't mind it as he does work outside in the cold and it does bother his bronchitis.  He states that he does have some of the other inhaler left but it would be nice to have another one \"on hand\".  Advised the patient to continue to use the other inhaler \"as needed\" and if he continues to have issues with breathing or SOB to follow up.  He verbalizes understanding.    Aneta Agrawal RN    "

## 2019-02-25 ENCOUNTER — OFFICE VISIT (OUTPATIENT)
Dept: FAMILY MEDICINE | Facility: CLINIC | Age: 49
End: 2019-02-25
Payer: COMMERCIAL

## 2019-02-25 DIAGNOSIS — J01.00 ACUTE MAXILLARY SINUSITIS, RECURRENCE NOT SPECIFIED: Primary | ICD-10-CM

## 2019-02-25 PROCEDURE — 99213 OFFICE O/P EST LOW 20 MIN: CPT | Performed by: PHYSICIAN ASSISTANT

## 2019-02-25 RX ORDER — AZITHROMYCIN 250 MG/1
TABLET, FILM COATED ORAL
Qty: 6 TABLET | Refills: 0 | Status: SHIPPED | OUTPATIENT
Start: 2019-02-25 | End: 2019-03-02

## 2019-02-25 NOTE — LETTER
February 25, 2019      Jak Villalpando  [address] [facility] [address]        To Whom It May Concern:    Jak Villalpando  was seen on 2/25/2019.  Please excuse him  until 2/26/19 due to illness.        Sincerely,        Demetri Ortiz PA-C

## 2019-02-25 NOTE — PROGRESS NOTES
SUBJECTIVE:   Jak Villalpando is a 48 year old male who presents to clinic today for the following health issues:      Acute Illness   Acute illness concerns: sinus   Onset: 1 week but worse for about 4 days    Fever: no    Chills/Sweats: YES    Headache (location?): YES    Sinus Pressure:YES    Conjunctivitis:  YES: both    Ear Pain: YES: right    Rhinorrhea: YES    Congestion: YES    Sore Throat: YES     Cough: YES-productive of yellow sputum    Wheeze: YES    Decreased Appetite: no     Nausea: no    Vomiting: no    Diarrhea:  no    Dysuria/Freq.: no    Fatigue/Achiness: YES-     Sick/Strep Exposure: no      Therapies Tried and outcome: Flonase which helps some, and albuterol without improvement        Problem list and histories reviewed & adjusted, as indicated.  Additional history: as documented    Patient Active Problem List   Diagnosis     Migraine headache     CTS (carpal tunnel syndrome)     Smoker     CARDIOVASCULAR SCREENING; LDL GOAL LESS THAN 160     Upper back pain     Rib pain     Pain in thoracic spine     Compression fracture of thoracic vertebra (H)     Calculus of kidney (RENAL)     Postoperative pain     Past Surgical History:   Procedure Laterality Date     CYSTOSCOPY FLEXIBLE  7/2/2014    Procedure: CYSTOSCOPY FLEXIBLE;  Surgeon: Bharath Reyez MD;  Location:  OR     EXTRACORPOREAL SHOCK WAVE LITHOTRIPSY (ESWL)  7/2/2014    Procedure: EXTRACORPOREAL SHOCK WAVE LITHOTRIPSY (ESWL);  Surgeon: Bharath Reyez MD;  Location:  OR     EXTRACORPOREAL SHOCK WAVE LITHOTRIPSY (ESWL) Bilateral 9/20/2017    Procedure: EXTRACORPOREAL SHOCK WAVE LITHOTRIPSY (ESWL);   BILATERAL EXTRACORPOREAL SHOCK WAVE LITHOTRIPSY ;  Surgeon: Bharath Reyez MD;  Location:  OR     HERNIORRHAPHY INGUINAL  5/22/2014    Procedure: HERNIORRHAPHY INGUINAL;  Surgeon: Kush Weber MD;  Location:  OR       Social History     Tobacco Use     Smoking status: Former Smoker     Packs/day: 1.00     Years: 25.00      Pack years: 25.00     Types: Cigarettes     Last attempt to quit: 2013     Years since quittin.6     Smokeless tobacco: Former User   Substance Use Topics     Alcohol use: Yes     Comment: 1 drink every couple of weeks     Family History   Problem Relation Age of Onset     EYE* Mother         Eye pressure     Cerebrovascular Disease Maternal Grandmother      Diabetes Maternal Grandmother      Hypertension Maternal Grandmother      Arthritis Maternal Grandmother      Cancer Maternal Grandfather         Brain Tumor     Arthritis Maternal Grandfather      Cerebrovascular Disease Paternal Grandmother      Diabetes Paternal Grandmother      Eczema Son      Other - See Comments Father         brain tumor      Cancer Father      Other - See Comments Other         varicose veins  many in family          Current Outpatient Medications   Medication Sig Dispense Refill     albuterol (PROAIR HFA/PROVENTIL HFA/VENTOLIN HFA) 108 (90 Base) MCG/ACT inhaler Inhale 2 puffs into the lungs every 6 hours 1 Inhaler 0     benzonatate (TESSALON) 100 MG capsule Take 1 capsule (100 mg) by mouth 3 times daily as needed for cough 30 capsule 0     fluticasone (FLONASE) 50 MCG/ACT nasal spray PLACE 1-2 SPRAYS INTO BOTH NOSTRILS DAILY 16 mL 11     omeprazole (PRILOSEC) 20 MG CR capsule TAKE 1 CAPSULE BY MOUTH EVERY DAY 90 capsule 2     tamsulosin (FLOMAX) 0.4 MG capsule TAKE 1 CAPSULE BY MOUTH EVERY DAY (Patient not taking: Reported on 2019) 30 capsule 10     Allergies   Allergen Reactions     Ibuprofen Sodium Hives     ibuprofen       Reviewed and updated as needed this visit by clinical staff       Reviewed and updated as needed this visit by Provider         ROS:  Constitutional, HEENT, cardiovascular, pulmonary, gi and gu systems are negative, except as otherwise noted.    OBJECTIVE:     There were no vitals taken for this visit.  There is no height or weight on file to calculate BMI.  GENERAL: healthy, alert and no  distress  EYES: Eyes grossly normal to inspection, PERRL and conjunctivae and sclerae normal  HENT: ear canals and TM's normal, nose and mouth without ulcers or lesions  RESP: lungs clear to auscultation - no rales, rhonchi or wheezes  CV: regular rate and rhythm, normal S1 S2, no S3 or S4, no murmur, click or rub, no peripheral edema and peripheral pulses strong  MS: no gross musculoskeletal defects noted, no edema  SKIN: no suspicious lesions or rashes  NEURO: Normal strength and tone, mentation intact and speech normal  PSYCH: mentation appears normal, affect normal/bright  LYMPH: no cervical, supraclavicular, axillary, or inguinal adenopathy    Diagnostic Test Results:  none     ASSESSMENT/PLAN:       (J01.00) Acute maxillary sinusitis, recurrence not specified  (primary encounter diagnosis)    Comment: Likely still viral at this time. Prescription sent in to start in a couple days if not improving.    Plan: azithromycin (ZITHROMAX) 250 MG tablet              Patient Instructions     Patient Education     Sinusitis (Antibiotic Treatment)    The sinuses are air-filled spaces within the bones of the face. They connect to the inside of the nose. Sinusitis is an inflammation of the tissue that lines the sinuses. Sinusitis can occur during a cold. It can also happen due to allergies to pollens and other particles in the air. Sinusitis can cause symptoms of sinus congestion and a feeling of fullness. A sinus infection causes fever, headache, and facial pain. There is often green or yellow fluid draining from the nose or into the back of the throat (post-nasal drip). You have been given antibiotics to treat this condition.  Home care    Take the full course of antibiotics as instructed. Do not stop taking them, even when you feel better.    Drink plenty of water, hot tea, and other liquids. This may help thin nasal mucus. It also may help your sinuses drain fluids.    Heat may help soothe painful areas of your face.  Use a towel soaked in hot water. Or,  the shower and direct the warm spray onto your face. Using a vaporizer along with a menthol rub at night may also help soothe symptoms.     An expectorant with guaifenesin may help thin nasal mucus and help your sinuses drain fluids.    You can use an over-the-counter decongestant, unless a similar medicine was prescribed to you. Nasal sprays work the fastest. Use one that contains phenylephrine or oxymetazoline. First blow your nose gently. Then use the spray. Do not use these medicines more often than directed on the label. If you do, your symptoms may get worse. You may also take pills that contain pseudoephedrine. Don t use products that combine multiple medicines. This is because side effects may be increased. Read labels. You can also ask the pharmacist for help. (People with high blood pressure should not use decongestants. They can raise blood pressure.)    Over-the-counter antihistamines may help if allergies contributed to your sinusitis.      Do not use nasal rinses or irrigation during an acute sinus infection, unless your healthcare provider tells you to. Rinsing may spread the infection to other areas in your sinuses.    Use acetaminophen or ibuprofen to control pain, unless another pain medicine was prescribed to you. If you have chronic liver or kidney disease or ever had a stomach ulcer, talk with your healthcare provider before using these medicines. (Aspirin should never be taken by anyone under age 18 who is ill with a fever. It may cause severe liver damage.)    Don't smoke. This can make symptoms worse.  Follow-up care  Follow up with your healthcare provider or our staff if you are better in 1 week.  When to seek medical advice  Call your healthcare provider if any of these occur:    Facial pain or headache that gets worse    Stiff neck    Unusual drowsiness or confusion    Swelling of your forehead or eyelids    Vision problems, such as blurred or  double vision    Fever of 100.4 F (38 C) or higher, or as directed by your healthcare provider    Seizure    Breathing problems    Symptoms don't go away in 10 days  Prevention  Here are steps you can take to help prevent an infection:    Keep good hand washing habits.    Don t have close contact with people who have sore throats, colds, or other upper respiratory infections.    Don t smoke, and stay away from secondhand smoke.    Stay up to date with of your vaccines.  Date Last Reviewed: 11/1/2017 2000-2018 [employer]. [address] [place] [id number]. All rights reserved. This information is not intended as a substitute for professional medical care. Always follow your healthcare professional's instructions.               Demetri Ortiz PA-C  [facility]

## 2019-02-25 NOTE — PATIENT INSTRUCTIONS
Patient Education     Sinusitis (Antibiotic Treatment)    The sinuses are air-filled spaces within the bones of the face. They connect to the inside of the nose. Sinusitis is an inflammation of the tissue that lines the sinuses. Sinusitis can occur during a cold. It can also happen due to allergies to pollens and other particles in the air. Sinusitis can cause symptoms of sinus congestion and a feeling of fullness. A sinus infection causes fever, headache, and facial pain. There is often green or yellow fluid draining from the nose or into the back of the throat (post-nasal drip). You have been given antibiotics to treat this condition.  Home care    Take the full course of antibiotics as instructed. Do not stop taking them, even when you feel better.    Drink plenty of water, hot tea, and other liquids. This may help thin nasal mucus. It also may help your sinuses drain fluids.    Heat may help soothe painful areas of your face. Use a towel soaked in hot water. Or,  the shower and direct the warm spray onto your face. Using a vaporizer along with a menthol rub at night may also help soothe symptoms.     An expectorant with guaifenesin may help thin nasal mucus and help your sinuses drain fluids.    You can use an over-the-counter decongestant, unless a similar medicine was prescribed to you. Nasal sprays work the fastest. Use one that contains phenylephrine or oxymetazoline. First blow your nose gently. Then use the spray. Do not use these medicines more often than directed on the label. If you do, your symptoms may get worse. You may also take pills that contain pseudoephedrine. Don t use products that combine multiple medicines. This is because side effects may be increased. Read labels. You can also ask the pharmacist for help. (People with high blood pressure should not use decongestants. They can raise blood pressure.)    Over-the-counter antihistamines may help if allergies contributed to your  sinusitis.      Do not use nasal rinses or irrigation during an acute sinus infection, unless your healthcare provider tells you to. Rinsing may spread the infection to other areas in your sinuses.    Use acetaminophen or ibuprofen to control pain, unless another pain medicine was prescribed to you. If you have chronic liver or kidney disease or ever had a stomach ulcer, talk with your healthcare provider before using these medicines. (Aspirin should never be taken by anyone under age 18 who is ill with a fever. It may cause severe liver damage.)    Don't smoke. This can make symptoms worse.  Follow-up care  Follow up with your healthcare provider or our staff if you are better in 1 week.  When to seek medical advice  Call your healthcare provider if any of these occur:    Facial pain or headache that gets worse    Stiff neck    Unusual drowsiness or confusion    Swelling of your forehead or eyelids    Vision problems, such as blurred or double vision    Fever of 100.4 F (38 C) or higher, or as directed by your healthcare provider    Seizure    Breathing problems    Symptoms don't go away in 10 days  Prevention  Here are steps you can take to help prevent an infection:    Keep good hand washing habits.    Don t have close contact with people who have sore throats, colds, or other upper respiratory infections.    Don t smoke, and stay away from secondhand smoke.    Stay up to date with of your vaccines.  Date Last Reviewed: 11/1/2017 2000-2018 [employer]. [address] [place] [address], [place] [address]. All rights reserved. This information is not intended as a substitute for professional medical care. Always follow your healthcare professional's instructions.

## 2019-08-29 DIAGNOSIS — K21.9 GASTROESOPHAGEAL REFLUX DISEASE, ESOPHAGITIS PRESENCE NOT SPECIFIED: ICD-10-CM

## 2019-08-29 NOTE — LETTER
[address], [address]  [facility] [address]  Phone: [phone]  Fax: [phone]    09/03/19    Jak Villalpando  [address] [place] [id number]      Dear Jak:       We recently received a request to refill your medication.    A review of your chart indicates that an appointment is required with your provider for general well being exam.     Please schedule an appointment for an office visit. You can schedule on line or call us at [phone].     We have authorized one refill of your medication to allow time for you to schedule your appointment.    Taking care of your health is important to us, and ongoing visits with your provider are vital to your care.  We look forward to seeing you in the near future.            Sincerely,      Bladimir Rushing PA-C/Radha GARCIA RN

## 2019-08-29 NOTE — TELEPHONE ENCOUNTER
"Requested Prescriptions   Pending Prescriptions Disp Refills     omeprazole (PRILOSEC) 20 MG DR capsule [Pharmacy Med Name: OMEPRAZOLE DR 20 MG CAPSULE] 30 capsule 8     Sig: TAKE 1 CAPSULE BY MOUTH EVERY DAY   Last Written Prescription Date:  11/8/18  Last Fill Quantity: 90,  # refills: 2   Last Office Visit: 2/25/2019 [place]      Return in about 2 weeks (around 3/11/2019) for If not improving or sooner if worsening.    Future Office Visit:         PPI Protocol Passed - 8/29/2019  1:13 AM        Passed - Not on Clopidogrel (unless Pantoprazole ordered)        Passed - No diagnosis of osteoporosis on record        Passed - Recent (12 mo) or future (30 days) visit within the authorizing provider's specialty     Patient had office visit in the last 12 months or has a visit in the next 30 days with authorizing provider or within the authorizing provider's specialty.  See \"Patient Info\" tab in inbasket, or \"Choose Columns\" in Meds & Orders section of the refill encounter.              Passed - Medication is active on med list        Passed - Patient is age 18 or older        "

## 2019-09-03 NOTE — TELEPHONE ENCOUNTER
Pt has not have general physical in long time, only in for acute illness    Medication is being filled for 1 time refill only due to:  pt is due for an appointment, letter sent to schedule appt within a month.    Radha Damian RN, BS  Clinical Nurse Triage.

## 2019-09-24 ENCOUNTER — OFFICE VISIT (OUTPATIENT)
Dept: FAMILY MEDICINE | Facility: CLINIC | Age: 49
End: 2019-09-24
Payer: COMMERCIAL

## 2019-09-24 VITALS
RESPIRATION RATE: 17 BRPM | SYSTOLIC BLOOD PRESSURE: 120 MMHG | DIASTOLIC BLOOD PRESSURE: 70 MMHG | HEART RATE: 80 BPM | TEMPERATURE: 98.2 F | OXYGEN SATURATION: 97 % | HEIGHT: 73 IN | BODY MASS INDEX: 32.87 KG/M2 | WEIGHT: 248 LBS

## 2019-09-24 DIAGNOSIS — J01.90 ACUTE NON-RECURRENT SINUSITIS, UNSPECIFIED LOCATION: Primary | ICD-10-CM

## 2019-09-24 DIAGNOSIS — K21.9 GASTROESOPHAGEAL REFLUX DISEASE, ESOPHAGITIS PRESENCE NOT SPECIFIED: ICD-10-CM

## 2019-09-24 DIAGNOSIS — H61.21 IMPACTED CERUMEN OF RIGHT EAR: ICD-10-CM

## 2019-09-24 PROCEDURE — 99213 OFFICE O/P EST LOW 20 MIN: CPT | Performed by: FAMILY MEDICINE

## 2019-09-24 RX ORDER — FLUTICASONE PROPIONATE 50 MCG
SPRAY, SUSPENSION (ML) NASAL
Qty: 16 ML | Refills: 11 | Status: SHIPPED | OUTPATIENT
Start: 2019-09-24 | End: 2022-04-19

## 2019-09-24 RX ORDER — AMOXICILLIN 875 MG
875 TABLET ORAL 2 TIMES DAILY
Qty: 20 TABLET | Refills: 0 | Status: SHIPPED | OUTPATIENT
Start: 2019-09-24 | End: 2020-03-02

## 2019-09-24 ASSESSMENT — MIFFLIN-ST. JEOR: SCORE: 2033.8

## 2019-09-24 NOTE — PROGRESS NOTES
"Subjective     Jak Villalpando is a 49 year old male who presents to clinic today for the following health issues:    HPI     Patient has had global head pressure for the past three days. The worst pain is located at his temples. In addition, he has felt \"fuzzy\" or like he is floating in a boat. The symptoms worsen while being upright and walking around. His tinnitus recently worsened. Patient has stable hearing loss. The symptoms seem similar to past sinus infections and different from his migraines. He is taking Tylenol and Advil. Patient uses Flonase as well. Denies fever, vision changes, thick or bloody nasal drainage, nausea.     History of GERD. His heartburn is well controlled with the omeprazole.     Patient Active Problem List   Diagnosis     Migraine headache     CTS (carpal tunnel syndrome)     Smoker     Upper back pain     Rib pain     Pain in thoracic spine     Compression fracture of thoracic vertebra (H)     Calculus of kidney (RENAL)     Postoperative pain     Past Surgical History:   Procedure Laterality Date     CYSTOSCOPY FLEXIBLE  2014    Procedure: CYSTOSCOPY FLEXIBLE;  Surgeon: Bharath Reyez MD;  Location:  OR     EXTRACORPOREAL SHOCK WAVE LITHOTRIPSY (ESWL)  2014    Procedure: EXTRACORPOREAL SHOCK WAVE LITHOTRIPSY (ESWL);  Surgeon: Bharath Reyez MD;  Location:  OR     EXTRACORPOREAL SHOCK WAVE LITHOTRIPSY (ESWL) Bilateral 2017    Procedure: EXTRACORPOREAL SHOCK WAVE LITHOTRIPSY (ESWL);   BILATERAL EXTRACORPOREAL SHOCK WAVE LITHOTRIPSY ;  Surgeon: Bharath Reyez MD;  Location:  OR     HERNIORRHAPHY INGUINAL  2014    Procedure: HERNIORRHAPHY INGUINAL;  Surgeon: Kush Weber MD;  Location:  OR       Social History     Tobacco Use     Smoking status: Former Smoker     Packs/day: 1.00     Years: 25.00     Pack years: 25.00     Types: Cigarettes     Last attempt to quit: 2013     Years since quittin.2     Smokeless tobacco: Former User " "  Substance Use Topics     Alcohol use: Yes     Comment: 1 drink every couple of weeks     Family History   Problem Relation Age of Onset     EYE* Mother         Eye pressure     Cerebrovascular Disease Maternal Grandmother      Diabetes Maternal Grandmother      Hypertension Maternal Grandmother      Arthritis Maternal Grandmother      Cancer Maternal Grandfather         Brain Tumor     Arthritis Maternal Grandfather      Cerebrovascular Disease Paternal Grandmother      Diabetes Paternal Grandmother      Eczema Son      Other - See Comments Father         brain tumor      Cancer Father      Other - See Comments Other         varicose veins  many in family          Reviewed and updated as needed this visit by Provider  Tobacco  Allergies  Meds  Problems  Med Hx  Surg Hx  Fam Hx       Review of Systems   ROS COMP: CONSTITUTIONAL: NEGATIVE for fever  EYES: NEGATIVE for vision changes or irritation  ENT/MOUTH: as noted above   GI: POSITIVE for Hx GERD and NEGATIVE for nausea   NEURO: as noted above     This document serves as a record of the services and decisions personally performed and made by Yosef Stubbs MD. It was created on his behalf by Norma Marin, a trained medical scribe. The creation of this document is based on the provider's statements to the medical scribe.  Norma Marin 8:49 AM September 24, 2019      Objective    /70 (BP Location: Right arm, Patient Position: Chair, Cuff Size: Adult Regular)   Pulse 80   Temp 98.2  F (36.8  C) (Oral)   Resp 17   Ht 1.854 m (6' 1\")   Wt 112.5 kg (248 lb)   SpO2 97%   BMI 32.72 kg/m    Body mass index is 32.72 kg/m .  Physical Exam   GENERAL: healthy, alert and no distress  EYES: Eyes grossly normal to inspection, PERRL and conjunctivae and sclerae normal  HENT: ears impacted with cerumen, ear canals and TM's normal, nose and mouth without ulcers or lesions, sinuses: maxillary tenderness on right  NECK: no adenopathy, no asymmetry, masses, or " "scars  RESP: lungs clear to auscultation - no rales, rhonchi or wheezes  CV: regular rate and rhythm, normal S1 S2, no S3 or S4, no murmur, click or rub, no peripheral edema and peripheral pulses strong    Cerumen removed right side ear canals via lavage.      Assessment & Plan     1. Acute non-recurrent sinusitis, unspecified location  Somewhat atypical symptoms but patient is having sinus pressure and headache and he states this is consistent with his previous sinus infection symptoms that he has had.  Will have trial of antibiotics below.  Follow-up in 2 weeks with his primary care provider if not improving.  - amoxicillin (AMOXIL) 875 MG tablet; Take 1 tablet (875 mg) by mouth 2 times daily for 10 days  Dispense: 20 tablet; Refill: 0    2. Gastroesophageal reflux disease, esophagitis presence not specified  - omeprazole (PRILOSEC) 20 MG DR capsule; TAKE 1 CAPSULE BY MOUTH EVERY DAY  Dispense: 90 capsule; Refill: 4    3. Impacted cerumen of right ear  Cerumen removed right side ear canals via lavage.     BMI:   Estimated body mass index is 32.72 kg/m  as calculated from the following:    Height as of this encounter: 1.854 m (6' 1\").    Weight as of this encounter: 112.5 kg (248 lb).   Weight management plan: Discussed healthy diet and exercise guidelines    Return in about 2 weeks (around 10/8/2019) for if not improving or sooner if worsening symptoms.    The information in this document, created by the medical scribe for me, accurately reflects the services I personally performed and the decisions made by me. I have reviewed and approved this document for accuracy prior to leaving the patient care area.  September 24, 2019 9:02 AM    Yosef Stubbs MD  [facility]        "

## 2019-10-10 ENCOUNTER — TELEPHONE (OUTPATIENT)
Dept: FAMILY MEDICINE | Facility: CLINIC | Age: 49
End: 2019-10-10

## 2019-10-10 NOTE — LETTER
October 10, 2019      Jak Villalpando  [address] [address]        Dear Jak,       Our records indicate that you may be due for preventative health care services.  Please make an appointment or call to set up the following tests as recommended.  If you have already had this testing done at another clinic please contact us to help us update our records to reflect the preventative care that you have had.    Colon cancer screen (colonoscopy) - Recommended every ten years for everyone age 50 and older. Screening is done by a colonoscopy every 10 years starting at age 50 or earlier if you have a family history of colon cancer.  If you cannot or do not want to have a colonoscopy you can opt to do an annual fecal occult blood immunoassay test (FIT stool test). We strongly urge our patients to consider having a colonoscopy done, which is the best screening test available and only needs to be done every 10 years if normal.                                                                                                                                               Thank you for choosing [facility]. We appreciate the opportunity to serve you and look forward to supporting your healthcare needs in the future.    If you have any questions or concerns, please contact us at [phone].            Sincerely,        Heide Maciel MD

## 2020-03-02 ENCOUNTER — OFFICE VISIT (OUTPATIENT)
Dept: FAMILY MEDICINE | Facility: CLINIC | Age: 50
End: 2020-03-02
Payer: COMMERCIAL

## 2020-03-02 VITALS
RESPIRATION RATE: 18 BRPM | SYSTOLIC BLOOD PRESSURE: 136 MMHG | WEIGHT: 250 LBS | TEMPERATURE: 98.3 F | DIASTOLIC BLOOD PRESSURE: 89 MMHG | HEART RATE: 111 BPM | BODY MASS INDEX: 32.98 KG/M2

## 2020-03-02 DIAGNOSIS — J01.00 ACUTE MAXILLARY SINUSITIS, RECURRENCE NOT SPECIFIED: Primary | ICD-10-CM

## 2020-03-02 DIAGNOSIS — H66.91 ACUTE RIGHT OTITIS MEDIA: ICD-10-CM

## 2020-03-02 DIAGNOSIS — E66.811 OBESITY, CLASS I, BMI 30-34.9: ICD-10-CM

## 2020-03-02 DIAGNOSIS — K21.9 GASTROESOPHAGEAL REFLUX DISEASE WITHOUT ESOPHAGITIS: ICD-10-CM

## 2020-03-02 PROCEDURE — 99214 OFFICE O/P EST MOD 30 MIN: CPT | Performed by: FAMILY MEDICINE

## 2020-03-02 ASSESSMENT — ENCOUNTER SYMPTOMS
VOICE CHANGE: 0
BLOOD IN STOOL: 0
FEVER: 0
WHEEZING: 0
CHILLS: 1
TROUBLE SWALLOWING: 0
UNEXPECTED WEIGHT CHANGE: 0
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
## 2020-03-02 NOTE — PATIENT INSTRUCTIONS
Augmentin, as prescribed.    Daily Flonase, as discussed.    Stop the Flonase if nasal bleeding occurs, as discussed.    Limit Afrin nasal spray to 3 days.    Continue the Prilosec and work on dietary changes, as discussed.    Continue to work on weight loss, as discussed.    Follow-up with ENT if continued sinus symptoms, right ear pain, tinnitus, snoring, or GERD after approximately 3 weeks, as discussed.    Follow-up sooner if fever or worsening symptoms.    Follow-up in the ER immediately if:  chest pain >5 minutes, breathing concerns (not sensitive to Albuterol), or other severe/emergent symptoms, as discussed.

## 2020-03-17 DIAGNOSIS — J20.8 ACUTE VIRAL BRONCHITIS: ICD-10-CM

## 2020-03-18 RX ORDER — ALBUTEROL SULFATE 90 UG/1
AEROSOL, METERED RESPIRATORY (INHALATION)
Qty: 8.5 INHALER | Refills: 0 | Status: SHIPPED | OUTPATIENT
Start: 2020-03-18 | End: 2022-04-19

## 2020-03-18 NOTE — TELEPHONE ENCOUNTER
Prescription approved per [facility] Refill Protocol.  Tabitha Nevarez RN, BSN

## 2020-05-14 ENCOUNTER — TRANSFERRED RECORDS (OUTPATIENT)
Dept: HEALTH INFORMATION MANAGEMENT | Facility: CLINIC | Age: 50
End: 2020-05-14

## 2021-09-08 ENCOUNTER — OFFICE VISIT (OUTPATIENT)
Dept: URGENT CARE | Facility: URGENT CARE | Age: 51
End: 2021-09-08
Payer: COMMERCIAL

## 2021-09-08 VITALS
SYSTOLIC BLOOD PRESSURE: 128 MMHG | HEART RATE: 74 BPM | OXYGEN SATURATION: 93 % | RESPIRATION RATE: 16 BRPM | DIASTOLIC BLOOD PRESSURE: 80 MMHG | TEMPERATURE: 98.5 F

## 2021-09-08 DIAGNOSIS — M76.61 ACHILLES BURSITIS OF RIGHT LOWER EXTREMITY: Primary | ICD-10-CM

## 2021-09-08 PROCEDURE — 99213 OFFICE O/P EST LOW 20 MIN: CPT | Performed by: PHYSICIAN ASSISTANT

## 2021-09-08 RX ORDER — METHYLPREDNISOLONE 4 MG
TABLET, DOSE PACK ORAL
Qty: 21 TABLET | Refills: 0 | Status: SHIPPED | OUTPATIENT
Start: 2021-09-08 | End: 2022-04-19

## 2021-09-08 NOTE — PATIENT INSTRUCTIONS
Patient Education     Tendonitis  A tendon is the thick fibrous cord that joins muscle to bone and allows joints to move. When a tendon becomes inflamed, it is called tendonitis. This can occur from overuse, injury, or infection. This usually involves the shoulders, forearm, wrist, hands and feet. Symptoms include pain, swelling and tenderness to the touch. Moving the joint increases the pain.  It takes 4 to 6 weeks or more for tendonitis to heal. It is treated by preventing motion of the tendon, occasionally with a splint or brace, and the use of anti-inflammatory medicine.  Home care    Some people find relief with ice packs. These can be crushed or cubed ice in a plastic bag or a bag of frozen vegetables wrapped in a thin towel. Other people get better relief with heat. This can include a hot shower, hot bath, or a moist towel warmed in a microwave. Try each and use the method that feels best, for 15 to 20 minutes several times a day.    Rest the inflamed joint and protect it from movement.    You may use over-the-counter ibuprofen or naproxen to treat pain and inflammation, unless another medicine was prescribed. If you can't take these medicines, acetaminophen may help with the pain, but does not treat inflammation. If you have chronic liver or kidney disease or ever had a stomach ulcer or gastrointestinal bleeding, talk with your doctor before using these medicines.    As your symptoms improve, begin gradual motion at the involved joint.  Follow-up care  Follow up with your healthcare provider if you are not improving after 5 to 7 days of treatment.  When to seek medical advice  Call your healthcare provider right away if any of these occur:    Redness over the painful area    Increasing pain or swelling at the joint    Fever lasting 24 to 48 hours or chills, or as advised by your healthcare provider  [employer]er[employer] last reviewed this educational content on 5/1/2018 2000-2021 [employer]. All  rights reserved. This information is not intended as a substitute for professional medical care. Always follow your healthcare professional's instructions.           Patient Education     Understanding Achilles Tendonitis    Achilles tendonitis is an overuse injury. It causes inflammation of the Achilles tendon. This tendon is found on the back of the ankle. It links the calf muscle to the heel bone. It helps you do pushing-off movements like running or standing on your toes.    How to say it  [id number]   What causes Achilles tendonitis?  Achilles tendonitis can happen if you do an activity like running, walking, or jumping too much. This overuse can strain, or pull, the tendon. It may lead to minor tearing of the tendon. An injury to the lower leg or foot can also cause it.   A tight calf can cause it so if you don t warm up before taking part in sports such as basketball, you are more likely to suffer from this condition. You are also more prone to it if you do too much of such an activity too quickly. Proper training and rest can help prevent it.   Symptoms of Achilles tendonitis   The main symptom of Achilles tendonitis is pain. This pain mostly happens when you move the ankle. The tendon may also feel stiff after a period of no activity, such as sleeping. It may also become swollen. You may hear a crackling sound when you move your ankle. Then tendon becomes thick and a bone spur may occur.   Treatment for Achilles tendonitis  Symptoms often get better after starting treatment. A full recovery may take several months. Treatments include:     Rest. You should stop or change the activity that caused the injury. The tendon will then have time to heal.    Cold or heat pack. These help reduce pain and swelling.    Prescription or over-the-counter medicines. These help reduce pain and swelling.    Shoe inserts. These devices can reduce strain on the Achilles tendon when you move. You may then feel  less pain.    Stretching and strengthening exercises. Certain exercises can help you regain flexibility and strength in your Achilles tendon.    Surgery. This option can fix the injured tendon. But you don t often need it unless other treatments don t work.  When to call your healthcare provider   Call your healthcare provider right away if you have any of these:    Fever of 100.4 F (38 C) or higher, or as directed by your provider    Chills    Pain that gets worse    Symptoms that don t get better, or get worse    New symptoms   [employer]er[employer] last reviewed this educational content on 3/1/2020    9332-1424 [employer]. All rights reserved. This information is not intended as a substitute for professional medical care. Always follow your healthcare professional's instructions.

## 2021-09-08 NOTE — PROGRESS NOTES
Chief Complaint   Patient presents with     Urgent Care     Pain     No known injury-Pain in Left heel-x1month-worse in last couple days       ASSESSMENT/PLAN:  Jak was seen today for urgent care and pain.    Diagnoses and all orders for this visit:    Achilles bursitis of right lower extremity  -     methylPREDNISolone (MEDROL DOSEPAK) 4 MG tablet therapy pack; Follow Package Directions    Patient symptoms and exam consistent with Achilles bursitis versus Achilles tendinopathy.  Discussed treatment options including NSAIDs if patient is allergic.  Will treat with a steroid burst, rest and I also recommend he treat his plantar fasciitis as he has done so in the past.  Follow-up if not improving in 2 weeks.    Jl Brumfield PA-C  20 minutes spent on the date of the encounter doing chart review, history and exam, documentation and further activities per the note    SUBJECTIVE:  Jak is a 51 year old male who presents to urgent care with left heel pain that started about a month ago.  Initially the pain was in the bottom of heel he does have a history of plantar fasciitis.  Over the last week he has noticed pain more in the posterior aspect of his heel with some swelling.  Pain is worse after first step niece had difficulty walking without a limp.  He also had heel pain consistent with plantar fasciitis in his right side but this improved with his at-home treatments    ROS: Pertinent ROS neg other than the symptoms noted above in the HPI.     OBJECTIVE:  /80   Pulse 74   Temp 98.5  F (36.9  C) (Oral)   Resp 16   SpO2 93%    GENERAL: healthy, alert and no distress  MS: no gross musculoskeletal defects noted, no edema  SKIN: Left lower extremity: No calf tenderness, no ankle tenderness, no midfoot tenderness.  Palpable mild swelling near the insertion of the Achilles tendon and behind the tendon itself.  Tendon nontender, palpable swelling that is tender consistent with bursitis.  Tender at the insertion of  the plantar fascia    DIAGNOSTICS    No results found for any visits on 09/08/21.     Current Outpatient Medications   Medication     fluticasone (FLONASE) 50 MCG/ACT nasal spray     PROAIR  (90 Base) MCG/ACT inhaler     omeprazole (PRILOSEC) 20 MG DR capsule     No current facility-administered medications for this visit.      Patient Active Problem List   Diagnosis     Migraine headache     CTS (carpal tunnel syndrome)     Smoker     Upper back pain     Rib pain     Pain in thoracic spine     Compression fracture of thoracic vertebra (H)     Calculus of kidney (RENAL)     Postoperative pain     Gastroesophageal reflux disease without esophagitis      Past Medical History:   Diagnosis Date     Migraines      Renal disease     kidney stone     Past Surgical History:   Procedure Laterality Date     CYSTOSCOPY FLEXIBLE  7/2/2014    Procedure: CYSTOSCOPY FLEXIBLE;  Surgeon: Bharath Reyez MD;  Location: [facility] OR     EXTRACORPOREAL SHOCK WAVE LITHOTRIPSY (ESWL)  7/2/2014    Procedure: EXTRACORPOREAL SHOCK WAVE LITHOTRIPSY (ESWL);  Surgeon: Bharath Reyez MD;  Location:  OR     EXTRACORPOREAL SHOCK WAVE LITHOTRIPSY (ESWL) Bilateral 9/20/2017    Procedure: EXTRACORPOREAL SHOCK WAVE LITHOTRIPSY (ESWL);   BILATERAL EXTRACORPOREAL SHOCK WAVE LITHOTRIPSY ;  Surgeon: Bharath Reyez MD;  Location: [facility] OR     HERNIORRHAPHY INGUINAL  5/22/2014    Procedure: HERNIORRHAPHY INGUINAL;  Surgeon: Kush Weber MD;  Location: [facility] OR     Family History   Problem Relation Age of Onset     EYE* Mother         Eye pressure     Cerebrovascular Disease Maternal Grandmother      Diabetes Maternal Grandmother      Hypertension Maternal Grandmother      Arthritis Maternal Grandmother      Cancer Maternal Grandfather         Brain Tumor     Arthritis Maternal Grandfather      Cerebrovascular Disease Paternal Grandmother      Diabetes Paternal Grandmother      Eczema Son      Other - See Comments Father         brain tumor       Cancer Father      Other - See Comments Other         varicose veins  many in family      Social History     Tobacco Use     Smoking status: Former Smoker     Packs/day: 1.00     Years: 25.00     Pack years: 25.00     Types: Cigarettes     Quit date: 2013     Years since quittin.1     Smokeless tobacco: Former User   Substance Use Topics     Alcohol use: Yes     Comment: 1 drink every couple of weeks              The plan of care was discussed with the patient. They understand and agree with the course of treatment prescribed. A printed summary was given including instructions and medications.  The use of Dragon/[employer] dictation services may have been used to construct the content in this note; any grammatical or spelling errors are non-intentional. Please contact the author of this note directly if you are in need of any clarification.

## 2021-09-16 ENCOUNTER — TELEPHONE (OUTPATIENT)
Dept: URGENT CARE | Facility: URGENT CARE | Age: 51
End: 2021-09-16

## 2022-01-05 ENCOUNTER — OFFICE VISIT (OUTPATIENT)
Dept: FAMILY MEDICINE | Facility: CLINIC | Age: 52
End: 2022-01-05
Payer: COMMERCIAL

## 2022-01-05 VITALS
DIASTOLIC BLOOD PRESSURE: 100 MMHG | HEIGHT: 73 IN | WEIGHT: 256.5 LBS | SYSTOLIC BLOOD PRESSURE: 148 MMHG | HEART RATE: 88 BPM | OXYGEN SATURATION: 94 % | TEMPERATURE: 98.3 F | RESPIRATION RATE: 16 BRPM | BODY MASS INDEX: 34 KG/M2

## 2022-01-05 DIAGNOSIS — J20.9 ACUTE BRONCHITIS WITH SYMPTOMS > 10 DAYS: Primary | ICD-10-CM

## 2022-01-05 DIAGNOSIS — Z12.11 COLON CANCER SCREENING: ICD-10-CM

## 2022-01-05 DIAGNOSIS — R03.0 ELEVATED BP WITHOUT DIAGNOSIS OF HYPERTENSION: ICD-10-CM

## 2022-01-05 PROCEDURE — 99214 OFFICE O/P EST MOD 30 MIN: CPT | Performed by: PHYSICIAN ASSISTANT

## 2022-01-05 RX ORDER — ALBUTEROL SULFATE 90 UG/1
2 AEROSOL, METERED RESPIRATORY (INHALATION) EVERY 6 HOURS
Qty: 18 G | Refills: 0 | Status: SHIPPED | OUTPATIENT
Start: 2022-01-05 | End: 2024-01-03

## 2022-01-05 RX ORDER — AZITHROMYCIN 250 MG/1
TABLET, FILM COATED ORAL
Qty: 6 TABLET | Refills: 0 | Status: SHIPPED | OUTPATIENT
Start: 2022-01-05 | End: 2022-01-10

## 2022-01-05 ASSESSMENT — MIFFLIN-ST. JEOR: SCORE: 2058.39

## 2022-01-05 ASSESSMENT — PAIN SCALES - GENERAL: PAINLEVEL: NO PAIN (0)

## 2022-01-05 NOTE — NURSING NOTE
"Chief Complaint   Patient presents with     Cough     Initial BP (!) 148/100 (BP Location: Right arm, Patient Position: Sitting, Cuff Size: Adult Large)   Pulse 88   Temp 98.3  F (36.8  C) (Oral)   Resp 16   Ht 1.848 m (6' 0.75\")   Wt 116.3 kg (256 lb 8 oz)   SpO2 94%   BMI 34.07 kg/m   Estimated body mass index is 34.07 kg/m  as calculated from the following:    Height as of this encounter: 1.848 m (6' 0.75\").    Weight as of this encounter: 116.3 kg (256 lb 8 oz).  BP completed using cuff size large right arm    Lisa Magill, CMA    "

## 2022-01-05 NOTE — PROGRESS NOTES
"  Assessment & Plan     Acute bronchitis with symptoms > 10 days    - albuterol (PROAIR HFA/PROVENTIL HFA/VENTOLIN HFA) 108 (90 Base) MCG/ACT inhaler; Inhale 2 puffs into the lungs every 6 hours  - azithromycin (ZITHROMAX) 250 MG tablet; Take 2 tablets (500 mg) by mouth daily for 1 day, THEN 1 tablet (250 mg) daily for 4 days.    Colon cancer screening    - Adult Gastro Ref - Procedure Only; Future    Elevated BP without diagnosis of hypertension  Will recheck next month- patient scheduled a physical.         BMI:   Estimated body mass index is 34.07 kg/m  as calculated from the following:    Height as of this encounter: 1.848 m (6' 0.75\").    Weight as of this encounter: 116.3 kg (256 lb 8 oz).   Weight management plan: Discussed healthy diet and exercise guidelines        Return for if symptoms worsen or fail to improve.    MABLE Sadler [facility] CANDIDA Benedict is a 51 year old who presents for the following health issues     HPI     Acute Illness  Acute illness concerns: cough   Onset/Duration: NAY   Symptoms:  Fever: no  Chills/Sweats: no  Headache (location?): no  Sinus Pressure: YES  Conjunctivitis:  no  Ear Pain: no  Rhinorrhea: YES  Congestion: YES- CHEST  Sore Throat: YES- DID HAVE   Cough: YES-productive of clear sputum, with shortness of breath, waxing and waning over time  Wheeze: YES  Decreased Appetite: no  Nausea: no  Vomiting: no  Diarrhea: YES  Dysuria/Freq.: no  Dysuria or Hematuria: no  Fatigue/Achiness: NO   Sick/Strep Exposure: no  Therapies tried and outcome: mucinex DM and nyquil- helped sleep    Dec 24th started with sore throat and nasal congestion.  Then went to chest.  Started to improve this week and then yesterday he began to feel much worse again.  No fever, body aches.  Maybe chills.  Has noticed wheezing and tightness in chest. covid test on 12/28 was negative.  Not done covid vaccines.      BP  BP Readings from Last 6 Encounters: " "  01/05/22 (!) 148/100   09/08/21 128/80   03/02/20 136/89   09/24/19 120/70   01/16/19 104/70   09/11/18 106/70     BP not normally elevated.    Review of Systems   Constitutional, HEENT, cardiovascular, pulmonary, gi and gu systems are negative, except as otherwise noted.      Objective    BP (!) 148/100 (BP Location: Right arm, Patient Position: Sitting, Cuff Size: Adult Large)   Pulse 88   Temp 98.3  F (36.8  C) (Oral)   Resp 16   Ht 1.848 m (6' 0.75\")   Wt 116.3 kg (256 lb 8 oz)   SpO2 94%   BMI 34.07 kg/m    Body mass index is 34.07 kg/m .  Physical Exam   GENERAL: healthy, alert and no distress  HENT: ear canals and TM's normal, nose and mouth without ulcers or lesions  NECK: no adenopathy, no asymmetry, masses, or scars and thyroid normal to palpation  RESP: lungs clear to auscultation - no rales, rhonchi or wheezes  CV: regular rate and rhythm, normal S1 S2, no S3 or S4, no murmur, click or rub, no peripheral edema and peripheral pulses strong  PSYCH: mentation appears normal, affect normal/bright            "

## 2022-04-19 ENCOUNTER — OFFICE VISIT (OUTPATIENT)
Dept: FAMILY MEDICINE | Facility: CLINIC | Age: 52
End: 2022-04-19
Payer: COMMERCIAL

## 2022-04-19 VITALS
WEIGHT: 257 LBS | TEMPERATURE: 98 F | SYSTOLIC BLOOD PRESSURE: 128 MMHG | BODY MASS INDEX: 34.14 KG/M2 | DIASTOLIC BLOOD PRESSURE: 88 MMHG | HEART RATE: 72 BPM | RESPIRATION RATE: 20 BRPM | OXYGEN SATURATION: 94 %

## 2022-04-19 DIAGNOSIS — Z13.220 SCREENING FOR HYPERLIPIDEMIA: ICD-10-CM

## 2022-04-19 DIAGNOSIS — Z11.59 NEED FOR HEPATITIS C SCREENING TEST: ICD-10-CM

## 2022-04-19 DIAGNOSIS — Z00.00 ROUTINE GENERAL MEDICAL EXAMINATION AT A HEALTH CARE FACILITY: Primary | ICD-10-CM

## 2022-04-19 DIAGNOSIS — R73.03 PRE-DIABETES: ICD-10-CM

## 2022-04-19 DIAGNOSIS — Z12.11 COLON CANCER SCREENING: ICD-10-CM

## 2022-04-19 DIAGNOSIS — Z11.4 SCREENING FOR HIV (HUMAN IMMUNODEFICIENCY VIRUS): ICD-10-CM

## 2022-04-19 LAB
ANION GAP SERPL CALCULATED.3IONS-SCNC: 5 MMOL/L (ref 3–14)
BUN SERPL-MCNC: 13 MG/DL (ref 7–30)
CALCIUM SERPL-MCNC: 9.3 MG/DL (ref 8.5–10.1)
CHLORIDE BLD-SCNC: 107 MMOL/L (ref 94–109)
CHOLEST SERPL-MCNC: 192 MG/DL
CO2 SERPL-SCNC: 28 MMOL/L (ref 20–32)
CREAT SERPL-MCNC: 1.04 MG/DL (ref 0.66–1.25)
ERYTHROCYTE [DISTWIDTH] IN BLOOD BY AUTOMATED COUNT: 12.3 % (ref 10–15)
FASTING STATUS PATIENT QL REPORTED: YES
GFR SERPL CREATININE-BSD FRML MDRD: 86 ML/MIN/1.73M2
GLUCOSE BLD-MCNC: 125 MG/DL (ref 70–99)
HCT VFR BLD AUTO: 44.8 % (ref 40–53)
HDLC SERPL-MCNC: 37 MG/DL
HGB BLD-MCNC: 15.8 G/DL (ref 13.3–17.7)
LDLC SERPL CALC-MCNC: 113 MG/DL
MCH RBC QN AUTO: 30.2 PG (ref 26.5–33)
MCHC RBC AUTO-ENTMCNC: 35.3 G/DL (ref 31.5–36.5)
MCV RBC AUTO: 86 FL (ref 78–100)
NONHDLC SERPL-MCNC: 155 MG/DL
PLATELET # BLD AUTO: 184 10E3/UL (ref 150–450)
POTASSIUM BLD-SCNC: 4.3 MMOL/L (ref 3.4–5.3)
RBC # BLD AUTO: 5.23 10E6/UL (ref 4.4–5.9)
SODIUM SERPL-SCNC: 140 MMOL/L (ref 133–144)
TRIGL SERPL-MCNC: 212 MG/DL
WBC # BLD AUTO: 8.2 10E3/UL (ref 4–11)

## 2022-04-19 PROCEDURE — 90715 TDAP VACCINE 7 YRS/> IM: CPT | Performed by: PHYSICIAN ASSISTANT

## 2022-04-19 PROCEDURE — 80061 LIPID PANEL: CPT | Performed by: PHYSICIAN ASSISTANT

## 2022-04-19 PROCEDURE — 86803 HEPATITIS C AB TEST: CPT | Performed by: PHYSICIAN ASSISTANT

## 2022-04-19 PROCEDURE — 80048 BASIC METABOLIC PNL TOTAL CA: CPT | Performed by: PHYSICIAN ASSISTANT

## 2022-04-19 PROCEDURE — 85027 COMPLETE CBC AUTOMATED: CPT | Performed by: PHYSICIAN ASSISTANT

## 2022-04-19 PROCEDURE — 87389 HIV-1 AG W/HIV-1&-2 AB AG IA: CPT | Performed by: PHYSICIAN ASSISTANT

## 2022-04-19 PROCEDURE — 99396 PREV VISIT EST AGE 40-64: CPT | Mod: 25 | Performed by: PHYSICIAN ASSISTANT

## 2022-04-19 PROCEDURE — 90471 IMMUNIZATION ADMIN: CPT | Performed by: PHYSICIAN ASSISTANT

## 2022-04-19 PROCEDURE — 36415 COLL VENOUS BLD VENIPUNCTURE: CPT | Performed by: PHYSICIAN ASSISTANT

## 2022-04-19 ASSESSMENT — ENCOUNTER SYMPTOMS
FREQUENCY: 1
WEAKNESS: 0
HEARTBURN: 0
PARESTHESIAS: 1
HEADACHES: 1
COUGH: 0
DIZZINESS: 0
CHILLS: 0
DIARRHEA: 0
SHORTNESS OF BREATH: 0
CONSTIPATION: 0
HEMATOCHEZIA: 0
NERVOUS/ANXIOUS: 0
HEMATURIA: 0
ABDOMINAL PAIN: 0
PALPITATIONS: 0
SORE THROAT: 0
EYE PAIN: 0
MYALGIAS: 1
JOINT SWELLING: 0
FEVER: 0
NAUSEA: 0
ARTHRALGIAS: 1
DYSURIA: 0

## 2022-04-19 ASSESSMENT — PAIN SCALES - GENERAL: PAINLEVEL: NO PAIN (0)

## 2022-04-19 NOTE — PROGRESS NOTES
SUBJECTIVE:   CC: Jak Villalpando is an 51 year old male who presents for preventative health visit.       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   Ref Range & Units 13 d ago   Hemoglobin A1C <7.0 % 6.1      Feels that he wants to manage this with diet and exercise and not take any medications.  Is already trying to make changes.  Maybe notes some tingling in feet on occasion.  There is diabetes in his family.        Today's PHQ-2 Score:   PHQ-2 (  [employer]) 2022   Q1: Little interest or pleasure in doing things 0   Q2: Feeling down, depressed or hopeless 0   PHQ-2 Score 0   PHQ-2 Total Score (12-17 Years)- Positive if 3 or more points; Administer PHQ-A if positive -   Q1: Little interest or pleasure in doing things Not at all   Q2: Feeling down, depressed or hopeless Not at all   PHQ-2 Score 0       Abuse: Current or Past(Physical, Sexual or Emotional)- No  Do you feel safe in your environment? Yes    Have you ever done Advance Care Planning? (For example, a Health Directive, POLST, or a discussion with a medical provider or your loved ones about your wishes): No, advance care planning information given to patient to review.  Patient declined advance care planning discussion at this time.    Social History     Tobacco Use     Smoking status: Former Smoker     Packs/day: 1.00     Years: 25.00     Pack years: 25.00     Types: Cigarettes     Quit date: 2013     Years since quittin.8     Smokeless tobacco: Former  User   Substance Use Topics     Alcohol use: Yes     Comment: 1 drink every couple of weeks         Alcohol Use 4/19/2022   Prescreen: >3 drinks/day or >7 drinks/week? No       Last PSA: No results found for: PSA    Reviewed orders with patient. Reviewed health maintenance and updated orders accordingly - Yes  Lab work is in process  Labs reviewed in EPIC    Reviewed and updated as needed this visit by clinical staff   Tobacco  Allergies  Meds   Med Hx  Surg Hx  Fam Hx  Soc Hx          Reviewed and updated as needed this visit by Provider                       Review of Systems   Constitutional: Negative for chills and fever.   HENT: Positive for hearing loss. Negative for congestion, ear pain and sore throat.    Eyes: Negative for pain and visual disturbance.   Respiratory: Negative for cough and shortness of breath.    Cardiovascular: Negative for chest pain, palpitations and peripheral edema.   Gastrointestinal: Negative for abdominal pain, constipation, diarrhea, heartburn, hematochezia and nausea.   Genitourinary: Positive for frequency. Negative for dysuria, genital sores, hematuria, impotence, penile discharge and urgency.   Musculoskeletal: Positive for arthralgias and myalgias. Negative for joint swelling.   Skin: Negative for rash.   Neurological: Positive for headaches and paresthesias. Negative for dizziness and weakness.   Psychiatric/Behavioral: Negative for mood changes. The patient is not nervous/anxious.          OBJECTIVE:   /88 (BP Location: Right arm, Patient Position: Sitting, Cuff Size: Adult Large)   Pulse 72   Temp 98  F (36.7  C) (Oral)   Resp 20   Wt 116.6 kg (257 lb)   SpO2 94%   BMI 34.14 kg/m      Physical Exam  GENERAL: healthy, alert and no distress  EYES: Eyes grossly normal to inspection, PERRL and conjunctivae and sclerae normal  NECK: no adenopathy, no asymmetry, masses, or scars and thyroid normal to palpation  RESP: lungs clear to auscultation - no rales,  "rhonchi or wheezes  CV: regular rate and rhythm, normal S1 S2, no S3 or S4, no murmur, click or rub, no peripheral edema and peripheral pulses strong  ABDOMEN: soft, nontender, no hepatosplenomegaly, no masses and bowel sounds normal  MS: no gross musculoskeletal defects noted, no edema  SKIN: no suspicious lesions or rashes  NEURO: Normal strength and tone, mentation intact and speech normal  PSYCH: mentation appears normal, affect normal/bright    Diagnostic Test Results:  Labs reviewed in Epic    ASSESSMENT/PLAN:   (Z00.00) Routine general medical examination at a health care facility  (primary encounter diagnosis)  Comment:   Plan: CBC with platelets, Basic metabolic panel  (Ca,        Cl, CO2, Creat, Gluc, K, Na, BUN)        Check fasting labs.    (R73.03) Pre-diabetes  Comment:   Plan: Basic metabolic panel  (Ca, Cl, CO2, Creat,         Gluc, K, Na, BUN)        Will manage with diet and exercise.  We can recheck labs in 6-12 months.    (Z12.11) Colon cancer screening  Comment:   Plan: Adult Gastro Ref - Procedure Only            (Z11.4) Screening for HIV (human immunodeficiency virus)  Comment:   Plan: HIV Antigen Antibody Combo            (Z11.59) Need for hepatitis C screening test  Comment:   Plan: Hepatitis C Screen Reflex to HCV RNA Quant and         Genotype            (Z13.220) Screening for hyperlipidemia  Comment:   Plan: Lipid panel reflex to direct LDL Fasting              Patient has been advised of split billing requirements and indicates understanding: Yes    COUNSELING:   Reviewed preventive health counseling, as reflected in patient instructions    Estimated body mass index is 34.14 kg/m  as calculated from the following:    Height as of 1/5/22: 1.848 m (6' 0.75\").    Weight as of this encounter: 116.6 kg (257 lb).     Weight management plan: Discussed healthy diet and exercise guidelines    He reports that he quit smoking about 8 years ago. His smoking use included cigarettes. He has a 25.00 " pack-year smoking history. He has quit using smokeless tobacco.      Counseling Resources:  ATP IV Guidelines  Pooled Cohorts Equation Calculator  FRAX Risk Assessment  ICSI Preventive Guidelines  Dietary Guidelines for Americans, 2010  USDA's MyPlate  ASA Prophylaxis  Lung CA Screening    MABLE Sadler [facility]

## 2022-04-19 NOTE — PROGRESS NOTES
Prior to immunization administration, verified patients identity using patient s name and date of birth. Please see Immunization Activity for additional information.     Screening Questionnaire for Adult Immunization    Are you sick today?   No   Do you have allergies to medications, food, a vaccine component or latex?   Ibuprofen    Have you ever had a serious reaction after receiving a vaccination?   No   Do you have a long-term health problem with heart, lung, kidney, or metabolic disease (e.g., diabetes), asthma, a blood disorder, no spleen, complement component deficiency, a cochlear implant, or a spinal fluid leak?  Are you on long-term aspirin therapy?   No   Do you have cancer, leukemia, HIV/AIDS, or any other immune system problem?   No   Do you have a parent, brother, or sister with an immune system problem?   No   In the past 3 months, have you taken medications that affect  your immune system, such as prednisone, other steroids, or anticancer drugs; drugs for the treatment of rheumatoid arthritis, Crohn s disease, or psoriasis; or have you had radiation treatments?   No   Have you had a seizure, or a brain or other nervous system problem?   No   During the past year, have you received a transfusion of blood or blood    products, or been given immune (gamma) globulin or antiviral drug?   No   For women: Are you pregnant or is there a chance you could become       pregnant during the next month?   No   Have you received any vaccinations in the past 4 weeks?   No     Immunization questionnaire answers, one was positive but it does not pertain to the shot     Per orders of Bladimir GERONIMO, injection of Tdapo given by Ghislaine Huynh. Patient instructed to remain in clinic for 15 minutes afterwards, and to report any adverse reaction to me immediately.       Screening performed by Ghislaine Huynh on 4/19/2022 at 7:37 AM.

## 2022-04-20 LAB
HCV AB SERPL QL IA: NONREACTIVE
HIV 1+2 AB+HIV1 P24 AG SERPL QL IA: NONREACTIVE

## 2022-09-03 ENCOUNTER — HEALTH MAINTENANCE LETTER (OUTPATIENT)
Age: 52
End: 2022-09-03

## 2022-10-18 ENCOUNTER — OFFICE VISIT (OUTPATIENT)
Dept: PEDIATRICS | Facility: CLINIC | Age: 52
End: 2022-10-18
Payer: COMMERCIAL

## 2022-10-18 ENCOUNTER — ANCILLARY PROCEDURE (OUTPATIENT)
Dept: GENERAL RADIOLOGY | Facility: CLINIC | Age: 52
End: 2022-10-18
Attending: PHYSICIAN ASSISTANT
Payer: COMMERCIAL

## 2022-10-18 VITALS
OXYGEN SATURATION: 96 % | TEMPERATURE: 97 F | WEIGHT: 253 LBS | HEART RATE: 84 BPM | RESPIRATION RATE: 20 BRPM | SYSTOLIC BLOOD PRESSURE: 118 MMHG | DIASTOLIC BLOOD PRESSURE: 86 MMHG | BODY MASS INDEX: 33.61 KG/M2

## 2022-10-18 DIAGNOSIS — M25.511 ACUTE PAIN OF RIGHT SHOULDER: Primary | ICD-10-CM

## 2022-10-18 DIAGNOSIS — M25.511 ACUTE PAIN OF RIGHT SHOULDER: ICD-10-CM

## 2022-10-18 DIAGNOSIS — R05.1 ACUTE COUGH: ICD-10-CM

## 2022-10-18 PROCEDURE — 73030 X-RAY EXAM OF SHOULDER: CPT | Mod: TC | Performed by: RADIOLOGY

## 2022-10-18 PROCEDURE — 71046 X-RAY EXAM CHEST 2 VIEWS: CPT | Mod: TC | Performed by: RADIOLOGY

## 2022-10-18 PROCEDURE — 99213 OFFICE O/P EST LOW 20 MIN: CPT | Performed by: PHYSICIAN ASSISTANT

## 2022-10-18 RX ORDER — ALBUTEROL SULFATE 90 UG/1
2 AEROSOL, METERED RESPIRATORY (INHALATION) EVERY 6 HOURS
Qty: 18 G | Refills: 0 | Status: SHIPPED | OUTPATIENT
Start: 2022-10-18 | End: 2024-01-03

## 2022-10-18 ASSESSMENT — PAIN SCALES - GENERAL: PAINLEVEL: MILD PAIN (2)

## 2022-10-18 NOTE — LETTER
October 18, 2022      Jak Villalpando  [address] [address]        To Whom It May Concern:    Jak Villalpando  was seen on 10/18/22.  Please excuse him through 10/19/22 due to injury.        Sincerely,        Sarthak Miles PA-C

## 2022-10-18 NOTE — PROGRESS NOTES
Assessment & Plan     Acute pain of right shoulder  Patient referred to ortho for further evaluation. 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  - XR Shoulder Right G/E 3 Views; Future  - Orthopedic  Referral; Future    Acute cough  Begin albuterol four times daily PRN.  - XR Chest 2 Views; Future  - albuterol (PROAIR HFA/PROVENTIL HFA/VENTOLIN HFA) 108 (90 Base) MCG/ACT inhaler; Inhale 2 puffs into the lungs every 6 hours    MABLE Maloney [facility] WARREN Benedict is a 52 year old, presenting for the following health issues:  Pain      HPI     Pain History:  When did you first notice your pain? - Acute Pain   Have you seen anyone else for your pain? No  Where in your body do you have pain? Musculoskeletal problem/pain  Onset/Duration: intermittent, for few days   Description  Location: shoulder - right radiates down to the arm   Achy and tender to touch  No neck pain  Joint Swelling: No  Redness: No  Pain: YES  Warmth: No  Intensity:  2/10, 10/10  Last night   Progression of Symptoms:  Intermittent, achy   Accompanying signs and symptoms:   Fevers: No  Numbness/tingling/weakness: YES- numbness   History  Trauma to the area: Trims trees, using arms a lot   Recent illness:  YES- COVID one month ago   Previous similar problem: YES- has had ache before   Previous evaluation:  No  Precipitating or alleviating factors:  Aggravating factors include: overuse  Therapies tried and outcome: tylenol- unsure if it helped but pain went away.   Work: trim trees     Patient also states he has a lingering, deep cough, nonproductive and is out of breath at times with tightness. Denies chest pain. Started after having COVID.   Uses wife's nebulizer and it helped but not using currently.     Review of Systems   Constitutional, HEENT, cardiovascular, pulmonary, gi and gu systems are negative, except as otherwise noted.      Objective    /86 (BP Location: Right arm, Patient Position:  Sitting, Cuff Size: Adult Large)   Pulse 84   Temp 97  F (36.1  C) (Temporal)   Resp 20   Wt 114.8 kg (253 lb)   SpO2 96%   BMI 33.61 kg/m    Body mass index is 33.61 kg/m .  Physical Exam   GENERAL: healthy, alert and no distress  EYES: Eyes grossly normal to inspection, PERRL and conjunctivae and sclerae normal  HENT: ear canals and TM's normal, nose and mouth without ulcers or lesions  NECK: no adenopathy, no asymmetry, masses, or scars and thyroid normal to palpation  RESP: diminished breath sounds-no rales, rhonchi or wheezes  CV: regular rate and rhythm, normal S1 S2, no S3 or S4  MSK: no edema, erythema, ecchymosis. Tender over the AC joint. Pain with abduction and external rotation.    Results for orders placed or performed in visit on 10/18/22   XR Chest 2 Views     Status: None    Narrative    XR CHEST 2 VIEWS 10/18/2022 2:39 PM    HISTORY: Acute cough    COMPARISON: Chest x-ray dated 1/9/2015.      Impression    IMPRESSION: Low lung volumes with interstitial crowding. No definite  infiltrate. Otherwise negative chest.    UMAIR WHEELER MD         SYSTEM ID:  [id number]   Results for orders placed or performed in visit on 10/18/22   XR Shoulder Right G/E 3 Views     Status: None    Narrative    RIGHT SHOULDER THREE OR MORE VIEWS  10/18/2022 2:39 PM     INDICATION: Right shoulder pain.    COMPARISON: None.      Impression    IMPRESSION: Normal joint spacing and alignment.  No fracture.     HIGINIO KRISHNAMURTHY MD         SYSTEM ID:  [id number]

## 2022-10-20 ENCOUNTER — OFFICE VISIT (OUTPATIENT)
Dept: ORTHOPEDICS | Facility: CLINIC | Age: 52
End: 2022-10-20
Payer: COMMERCIAL

## 2022-10-20 DIAGNOSIS — M75.41 IMPINGEMENT SYNDROME OF SHOULDER REGION, RIGHT: Primary | ICD-10-CM

## 2022-10-20 DIAGNOSIS — M25.511 ACUTE PAIN OF RIGHT SHOULDER: ICD-10-CM

## 2022-10-20 PROCEDURE — 20610 DRAIN/INJ JOINT/BURSA W/O US: CPT | Mod: RT | Performed by: PHYSICIAN ASSISTANT

## 2022-10-20 PROCEDURE — 99203 OFFICE O/P NEW LOW 30 MIN: CPT | Mod: 25 | Performed by: PHYSICIAN ASSISTANT

## 2022-10-20 RX ORDER — LIDOCAINE HYDROCHLORIDE 10 MG/ML
5 INJECTION, SOLUTION INFILTRATION; PERINEURAL
Status: SHIPPED | OUTPATIENT
Start: 2022-10-20

## 2022-10-20 RX ORDER — LIDOCAINE HYDROCHLORIDE 10 MG/ML
3 INJECTION, SOLUTION INFILTRATION; PERINEURAL
Status: SHIPPED | OUTPATIENT
Start: 2022-10-20

## 2022-10-20 RX ORDER — TESTOSTERONE CYPIONATE 200 MG/ML
2 INJECTION INTRAMUSCULAR
Status: SHIPPED | OUTPATIENT
Start: 2022-10-20

## 2022-10-20 RX ADMIN — LIDOCAINE HYDROCHLORIDE 3 ML: 10 INJECTION, SOLUTION INFILTRATION; PERINEURAL at 12:18

## 2022-10-20 RX ADMIN — LIDOCAINE HYDROCHLORIDE 5 ML: 10 INJECTION, SOLUTION INFILTRATION; PERINEURAL at 12:18

## 2022-10-20 RX ADMIN — TESTOSTERONE CYPIONATE 2 ML: 200 INJECTION INTRAMUSCULAR at 12:18

## 2022-10-20 NOTE — LETTER
[facility] [place] [id number]  [address]  [facility] [address]  [phone]          October 20, 2022    RE:  Jak Villalpando                                                                                                                                                       [address] [address]      To whom it may concern:    Jak Villalpando is under my professional care for Acute pain of right shoulder.  He  may return to work on Monday 10/24/2022.    Sincerely,        Villa Amaya PA-C

## 2022-10-20 NOTE — PROGRESS NOTES
HISTORY OF PRESENT ILLNESS:    Jak Villalpando is a 52 year old male who is seen in consultation at the request of Dr. Miles for Right shoulder pain.    Present symptoms:  Current symptoms began  4-5 month(s) ago, but has had issues for years off and on..  Reports insidious onset without acute precipitating event.  He reports occasionally sharp and shooting however normally it deep nagging Ache right  shoulder  that is located laterally; radiation Present down the outside of the arm to the elbow..  Symptoms are positional and variable in nature.  Pain is 8/10 in maximal severity, and 2/10 currently, at rest..  Symptoms are generally worse with/at use, sleeping on that side; better with/at rest.   Overall the patient feels the condition is  worsening. Other treatment so far has consisted of chiropractic with moderate relief, however this is only temporary for a day or 2..  Associated symptoms: has not had swelling, locking, or catching; denies buckling episodes or instability sensation.  He has had history of similar or related problems.  Patient works as a tree , at times makes it difficult to work.  Denies neck pain, or neck injuries, no increased pain with neck position.    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  Bothers him especially in the winter to the point that he is unable to work.  Orthopedic PMH: No surgeries.  Past Medical History:   Diagnosis Date     Migraines      Renal disease     kidney stone       Past Surgical History:   Procedure Laterality Date     CYSTOSCOPY FLEXIBLE  7/2/2014    Procedure: CYSTOSCOPY FLEXIBLE;  Surgeon: Bharath Reyez MD;  Location:  OR     EXTRACORPOREAL SHOCK WAVE LITHOTRIPSY (ESWL)  7/2/2014    Procedure: EXTRACORPOREAL SHOCK WAVE LITHOTRIPSY (ESWL);  Surgeon: Bharath Reyez MD;  Location:  OR     EXTRACORPOREAL SHOCK WAVE LITHOTRIPSY (ESWL) Bilateral 9/20/2017    Procedure: EXTRACORPOREAL  SHOCK WAVE LITHOTRIPSY (ESWL);   BILATERAL EXTRACORPOREAL SHOCK WAVE LITHOTRIPSY ;  Surgeon: Bharath Reyez MD;  Location: [facility] OR     HERNIORRHAPHY INGUINAL  2014    Procedure: HERNIORRHAPHY INGUINAL;  Surgeon: Kush Weber MD;  Location: [facility] OR       Family History   Problem Relation Age of Onset     EYE* Mother         Eye pressure     Cerebrovascular Disease Maternal Grandmother      Diabetes Maternal Grandmother      Hypertension Maternal Grandmother      Arthritis Maternal Grandmother      Cancer Maternal Grandfather         Brain Tumor     Arthritis Maternal Grandfather      Cerebrovascular Disease Paternal Grandmother      Diabetes Paternal Grandmother      Eczema Son      Other - See Comments Father         brain tumor      Cancer Father      Other - See Comments Other         varicose veins  many in family        Social History     Socioeconomic History     Marital status:      Spouse name: Not on file     Number of children: Not on file     Years of education: Not on file     Highest education level: Not on file   Occupational History     Not on file   Tobacco Use     Smoking status: Former     Packs/day: 1.00     Years: 25.00     Pack years: 25.00     Types: Cigarettes     Quit date: 2013     Years since quittin.3     Smokeless tobacco: Former   Vaping Use     Vaping Use: Never used   Substance and Sexual Activity     Alcohol use: Yes     Comment: 1 drink every couple of weeks     Drug use: No     Sexual activity: Yes     Partners: Female   Other Topics Concern     Parent/sibling w/ CABG, MI or angioplasty before 65F 55M? No   Social History Narrative     Not on file     Social Determinants of Health     Financial Resource Strain: Not on file   Food Insecurity: Not on file   Transportation Needs: Not on file   Physical Activity: Not on file   Stress: Not on file   Social Connections: Not on file   Intimate Partner Violence: Not on file   Housing Stability: Not on file        Current Outpatient Medications   Medication Sig Dispense Refill     albuterol (PROAIR HFA/PROVENTIL HFA/VENTOLIN HFA) 108 (90 Base) MCG/ACT inhaler Inhale 2 puffs into the lungs every 6 hours 18 g 0     albuterol (PROAIR HFA/PROVENTIL HFA/VENTOLIN HFA) 108 (90 Base) MCG/ACT inhaler Inhale 2 puffs into the lungs every 6 hours (Patient not taking: Reported on 4/19/2022) 18 g 0     omeprazole (PRILOSEC) 20 MG DR capsule TAKE 1 CAPSULE BY MOUTH EVERY DAY (Patient not taking: No sig reported) 90 capsule 4       Allergies   Allergen Reactions     Ibuprofen Sodium Hives     ibuprofen       Patient's past medical, surgical, social and family histories are reviewed today.  There are no significant contributory medical issues.  REVIEW OF SYSTEMS:  CONSTITUTIONAL:  NEGATIVE for fever, chills, change in weight  INTEGUMENTARY/SKIN:  NEGATIVE for worrisome rashes, moles or lesions  EYES:  NEGATIVE for vision changes or irritation  ENT/MOUTH:  NEGATIVE for ear, mouth and throat problems  RESP:  NEGATIVE for significant cough or SOB  BREAST:  NEGATIVE for masses, tenderness or discharge  CV:  NEGATIVE for chest pain, palpitations or peripheral edema  GI:  NEGATIVE for nausea, abdominal pain, heartburn, or change in bowel habits  :  Negative   MUSCULOSKELETAL:  See HPI above  NEURO:  NEGATIVE for weakness, dizziness or paresthesias  ENDOCRINE:  NEGATIVE for temperature intolerance, skin/hair changes  HEME/ALLERGY/IMMUNE:  NEGATIVE for bleeding problems  PSYCHIATRIC:  NEGATIVE for changes in mood or affect      PHYSICAL EXAM:  There were no vitals taken for this visit.  There is no height or weight on file to calculate BMI.   GENERAL APPEARANCE: healthy, well nourished.  Overweight  NEURO: He is alert and oriented x 3, mentation intact and speech normal  POSTURE: Stands with normal weight bearing line.  PSYCH:  mentation appears normal and affect normal/bright    MUSCULOSKELETAL: Examination of right shoulder.  GROSS  OBSERVATION: No lesions, sulcus sign, AC separation, deformities or atrophy noted.  PALPATION: No pain over clavicle, AC joint, spine.  Positive for pain at the supraspinatus insert.  No crepitus detected on motion.  ROM:  Right: Flexion 180, abduction 140, internal rotation to T12, external rotation 50, abduction to opposite shoulder without pain..  Left: Full and nonpainful  LIGAMENTOUS: Grossly intact  STRENGTH: All planes 5/5 however pain present on the right shoulder resisted flexion at 90 degrees, increases with internal rotation.  SPECIAL TESTS: Drop arm positive for pain, needs positive for due to pain, Hornblower's positive for pain, empty can positive for pain.    CONTRALATERAL JOINT:  no overlying skin change, observable deformity, or effusion; range of motion as noted above; strength and function are within normal limits; no obvious ligamentous instability.     SKIN: , no lesions, erythema noted bilaterally.  VASCULATURE:  Good capillary refill bilaterally.  SENSATION: Light touch intact in all hand digits and forearm.  Otherwise no gross deficits noted.   COORDINATION:  Able to move joint within above stated ROM with good control.    Imaging Interpretation:   X-rays from 10/18/2020 reviewed today.    My interpretation: Patient has type 1/2 acromion with significant lateral downsloping.  No AC joint or glenohumeral joint degeneration noted, no signs of rotator cuff arthropathy.  No acute fractures or lesions noted.    ASSESSMENT:  1. Impingement syndrome of shoulder region, right    2. Acute pain of right shoulder      Patient without signs of rotator cuff tear.  Signs of shoulder impingement of the right shoulder.  We discussed treatment options including but not limited to physical therapy, doing nothing, cortisone injection, arthroscopic decompression.    PLAN:    1.  After discussion we decided to proceed with subacromial right shoulder cortisone injection.    2.  Physical therapy ordered for right  shoulder to increase rotator cuff stabilization.  3.  Return to work letter off till Monday, discussed avoiding aggravating the shoulder during the first week after cortisone.    Follow up in clinic in 8 weeks or 6 weeks into therapy.    Villa Amaya PA-C  [facility] - Surgery    A total of 30 minutes spent with patient on care and care coordinating activities.    Large Joint Injection/Arthocentesis: R subacromial bursa    Date/Time: 10/20/2022 12:18 PM  Performed by: Villa Amaya PA-C  Authorized by: Villa Amaya PA-C     Indications:  Pain and osteoarthritis  Needle Size:  25 G  Guidance: landmark guided    Approach:  Posterior  Location:  Shoulder      Site:  R subacromial bursa  Medications:  2 mL dexamethasone 120 MG/30ML; 3 mL lidocaine 1 %; 5 mL lidocaine 1 %  Medications comment:  8mL of 1% Lidocaine was injected.  Outcome:  Tolerated well, no immediate complications  Procedure discussed: discussed risks, benefits, and alternatives    Consent Given by:  Patient  Timeout: timeout called immediately prior to procedure    Prep: patient was prepped and draped in usual sterile fashion

## 2022-10-20 NOTE — PATIENT INSTRUCTIONS
You received a cortisone injection today.  It may cause some numbness temporarily, however the cortisone may take 1-2 weeks to take effect.    Avoid overhead work during this period as much as possible.    Physical therapy should call you to schedule, you may start in 2 weeks.    Follow up if not improved after 2 months of therapy.    [facility]  [address] [place]  [address]  [phone] fax  [phone] for scheduling

## 2022-10-20 NOTE — Clinical Note
10/20/2022         RE: Jak Villalpando  [address] [address]      No notes on file      Villa Amaya PA-C

## 2022-10-24 ENCOUNTER — TELEPHONE (OUTPATIENT)
Dept: ORTHOPEDICS | Facility: CLINIC | Age: 52
End: 2022-10-24

## 2022-10-24 NOTE — TELEPHONE ENCOUNTER
Spoke with patient and informed him we have not received any paperwork to be completed for him. He was provided the office fax number and call back.     Vicki Davenport MSA, ATC  Certified Athletic Trainer

## 2022-10-24 NOTE — TELEPHONE ENCOUNTER
[facility] Call Center    Phone Message    May a detailed message be left on voicemail: yes     Reason for Call: Other: Pt had a work form faxed over on 10/20 and needs it to be filled out and signed before he can return to work . Pt calling for the status of the form please call him to confirm that it was or was not recvd      Action Taken: Other: ortho [place]    Travel Screening: Not Applicable

## 2022-10-25 NOTE — TELEPHONE ENCOUNTER
Reason for Call:  Form, our goal is to have forms completed with 7 days, however, some forms may require a visit or additional information.    Type of letter, form or note:  Return to work certification     Who is the form from?: Patient    Where did the form come from: Patient or family brought in       Phone number of person requesting form: [phone]  Can we leave a detailed message on this number:  NO    Desired completion date of form: 10/ 31/22    How will form be returned?:  fax to [phone] attn: January Willis    Has the patient signed a consent form for release of information (may be included with form)? NO    Additional comments: patient was seen in consultation for his right shoulder on 10/20/22 by Dandre Amaya.  Letter provided at time of visit to return to work on 10/24/22.     Form was completed and faxed to provided number.   Confirmed via RightFax.  Left message to notify patient of form completion.    Cristina Gar, ATC

## 2022-11-25 ENCOUNTER — THERAPY VISIT (OUTPATIENT)
Dept: PHYSICAL THERAPY | Facility: CLINIC | Age: 52
End: 2022-11-25
Attending: PHYSICIAN ASSISTANT
Payer: COMMERCIAL

## 2022-11-25 DIAGNOSIS — M25.511 ACUTE PAIN OF RIGHT SHOULDER: ICD-10-CM

## 2022-11-25 DIAGNOSIS — M75.41 IMPINGEMENT SYNDROME OF SHOULDER REGION, RIGHT: ICD-10-CM

## 2022-11-25 PROCEDURE — 97161 PT EVAL LOW COMPLEX 20 MIN: CPT | Mod: GP | Performed by: PHYSICAL THERAPIST

## 2022-11-25 PROCEDURE — 97110 THERAPEUTIC EXERCISES: CPT | Mod: GP | Performed by: PHYSICAL THERAPIST

## 2022-11-25 NOTE — PROGRESS NOTES
"Physical Therapy Initial Evaluation  Subjective:  The history is provided by the patient. No  was used.   Patient Health History  Jak Villalpando being seen for R shoulder pain.     Date of Onset: a few months ago.   Problem occurred: \"I'm old\"   Pain is reported as 1/10 on pain scale.  General health as reported by patient is good.  Pertinent medical history includes: none.   Red flags:  None as reported by patient.  Medical allergies: See EPIC.   Surgeries include:  None.    Current medications:  None.    Current occupation is .   Primary job tasks include:  Lifting/carrying and driving.                  Therapist Generated HPI Evaluation  Problem details: Pt. complains of right shoulder pain that has been present for a few months.  No known trauma.  PT order dated 10/20/22.  Injection at last ortho appt nearly abolished sx's.      .         Type of problem:  Right shoulder.    This is a chronic condition.  Condition occurred with:  Unknown cause.  Where condition occurred: for unknown reasons.  Patient reports pain:  In the joint.  Pain is described as aching and is intermittent.  Pain radiates to:  Shoulder and upper arm. Pain is worse during the day.  Since onset symptoms are rapidly improving (after injection).  Associated symptoms:  Loss of motion/stiffness and loss of strength. Symptoms are exacerbated by using arm at shoulder level, using arm overhead and using arm behind back  and relieved by rest.  Special tests included:  X-ray.  Previous treatment includes chiropractic. There was none improvement following previous treatment.  Restrictions due to condition include:  Working in normal job without restrictions.  Barriers include:  None as reported by patient.                        Objective:        Flexibility/Screens:   Positive screens:  Shoulder                             Shoulder Evaluation:  ROM:  AROM:  normal                            PROM:  " normal                                Strength:    Flexion: Left:5/5   Pain:    Right: 5/5     Pain:   Extension:  Left: 5/5    Pain:    Right: 5/5    Pain:  Abduction:  Left: 5/5  Pain:    Right: 5/5     Pain:    Internal Rotation:  Left:5/5     Pain:    Right: 5/5     Pain:  External Rotation:   Left:5/5     Pain:   Right:5/5     Pain:            Stability Testing:  normal      Special Tests:      Left shoulder negative for the following special tests:  Impingement; Rotator cuff tear and Acromioclavicular  Right shoulder positive for the following special tests:Impingement  Right shoulder negative for the following special tests:Rotator cuff tear and Acrimioclavicular  Palpation:  normal      Mobility Tests:  normal                                                 General     ROS    Assessment/Plan:    Patient is a 52 year old male with right side shoulder complaints.    Patient has the following significant findings with corresponding treatment plan.                Diagnosis 1:  R shoulder pain  Decreased strength - therapeutic exercise and therapeutic activities  Decreased function - therapeutic activities    Therapy Evaluation Codes:   1) Clinical presentation characteristics are:   Stable/Uncomplicated.  2) Decision-Making    Low complexity using standardized patient assessment instrument and/or measureable assessment of functional outcome.  Cumulative Therapy Evaluation is: Low complexity.    Previous and current functional limitations:  (See Goal Flow Sheet for this information)    Short term and Long term goals: (See Goal Flow Sheet for this information)     Communication ability:  Patient appears to be able to clearly communicate and understand verbal and written communication and follow directions correctly.  Treatment Explanation - The following has been discussed with the patient:   RX ordered/plan of care  Anticipated outcomes  Possible risks and side effects  This patient would benefit from PT  intervention to resume normal activities.   Rehab potential is good.    Frequency:  1 X week, once daily  Duration:  for 1-4 weeks  Discharge Plan:  Achieve all LTG.  Independent in home treatment program.  Reach maximal therapeutic benefit.    Please refer to the daily flowsheet for treatment today, total treatment time and time spent performing 1:1 timed codes.

## 2023-04-20 ENCOUNTER — PATIENT OUTREACH (OUTPATIENT)
Dept: CARE COORDINATION | Facility: CLINIC | Age: 53
End: 2023-04-20
Payer: COMMERCIAL

## 2023-06-03 ENCOUNTER — HEALTH MAINTENANCE LETTER (OUTPATIENT)
Age: 53
End: 2023-06-03

## 2024-01-03 ENCOUNTER — ANCILLARY PROCEDURE (OUTPATIENT)
Dept: GENERAL RADIOLOGY | Facility: CLINIC | Age: 54
End: 2024-01-03
Attending: FAMILY MEDICINE
Payer: COMMERCIAL

## 2024-01-03 ENCOUNTER — OFFICE VISIT (OUTPATIENT)
Dept: FAMILY MEDICINE | Facility: CLINIC | Age: 54
End: 2024-01-03
Payer: COMMERCIAL

## 2024-01-03 VITALS
SYSTOLIC BLOOD PRESSURE: 122 MMHG | BODY MASS INDEX: 32.1 KG/M2 | HEART RATE: 129 BPM | DIASTOLIC BLOOD PRESSURE: 79 MMHG | RESPIRATION RATE: 14 BRPM | WEIGHT: 237 LBS | TEMPERATURE: 99.4 F | OXYGEN SATURATION: 96 % | HEIGHT: 72 IN

## 2024-01-03 DIAGNOSIS — J40 BRONCHITIS: Primary | ICD-10-CM

## 2024-01-03 DIAGNOSIS — Z12.11 SCREEN FOR COLON CANCER: ICD-10-CM

## 2024-01-03 PROCEDURE — 99213 OFFICE O/P EST LOW 20 MIN: CPT | Performed by: FAMILY MEDICINE

## 2024-01-03 PROCEDURE — 87637 SARSCOV2&INF A&B&RSV AMP PRB: CPT | Performed by: FAMILY MEDICINE

## 2024-01-03 PROCEDURE — 71046 X-RAY EXAM CHEST 2 VIEWS: CPT | Mod: TC | Performed by: RADIOLOGY

## 2024-01-03 RX ORDER — ALBUTEROL SULFATE 90 UG/1
2 AEROSOL, METERED RESPIRATORY (INHALATION) EVERY 6 HOURS
Qty: 18 G | Refills: 0 | Status: SHIPPED | OUTPATIENT
Start: 2024-01-03 | End: 2024-01-04

## 2024-01-03 RX ORDER — ALBUTEROL SULFATE 90 UG/1
2 AEROSOL, METERED RESPIRATORY (INHALATION) EVERY 6 HOURS
Qty: 18 G | Refills: 0 | Status: CANCELLED | OUTPATIENT
Start: 2024-01-03

## 2024-01-03 RX ORDER — BENZONATATE 200 MG/1
200 CAPSULE ORAL 3 TIMES DAILY PRN
Qty: 30 CAPSULE | Refills: 0 | Status: SHIPPED | OUTPATIENT
Start: 2024-01-03 | End: 2024-01-04

## 2024-01-03 ASSESSMENT — ENCOUNTER SYMPTOMS
WHEEZING: 1
SHORTNESS OF BREATH: 1
HEADACHES: 1
CHILLS: 1
COUGH: 1
SWEATS: 1
SORE THROAT: 1
RHINORRHEA: 1

## 2024-01-03 ASSESSMENT — LIFESTYLE VARIABLES: SMOKING_STATUS: 0

## 2024-01-03 NOTE — PROGRESS NOTES
Assessment & Plan     Screen for colon cancer      Bronchitis  Advised about rest, OTC medications for symptoms, drink warm liquids, and may use humidifier at night time to improve the cough.  Pt oxygen is within normal, but he feels like he coughs everytime he walks, his HR was high today, mainly due to illness and high fever.   At this time, check below las and xray, keep close monitoring on symptoms, and call if shortness of breath when he is sedentary   - Symptomatic Influenza A/B, RSV, & SARS-CoV2 PCR (COVID-19); Future  - XR Chest 2 Views  - benzonatate (TESSALON) 200 MG capsule; Take 1 capsule (200 mg) by mouth 3 times daily as needed for cough  - Symptomatic Influenza A/B, RSV, & SARS-CoV2 PCR (COVID-19) Nose             BMI:   Estimated body mass index is 32.14 kg/m  as calculated from the following:    Height as of this encounter: 1.829 m (6').    Weight as of this encounter: 107.5 kg (237 lb).   Weight management plan: Discussed healthy diet and exercise guidelines        Camron Garcia MD  [facility] MELVI Benedict is a 53 year old, presenting for the following health issues:  Cough      1/3/2024     3:47 PM   Additional Questions   Roomed by Zafar CHANDLER       Cough  This is a new problem. The current episode started more than 1 week ago. The cough is Productive of blood-tinged sputum and productive of brown sputum. There has been no fever. Associated symptoms include chest pain, chills, sweats, ear congestion, ear pain, headaches, rhinorrhea, sore throat, shortness of breath and wheezing. He has tried nothing for the symptoms. The treatment provided no relief. He is not a smoker.          Acute Illness  Acute illness concerns: Cough  Onset/Duration: 12/30/2023  Symptoms:  Fever: No  Chills/Sweats: YES  Headache (location?): YES - Base of skull   Sinus Pressure: YES  Conjunctivitis:  No  Ear Pain: YES: bilateral  Rhinorrhea: YES  Congestion: YES  Sore Throat: YES  Cough:  YES-non-productive, productive of yellow sputum, with shortness of breath, barking, waxing and waning over time  Wheeze: YES  Decreased Appetite: YES  Nausea: YES  Vomiting: No  Diarrhea: YES  Dysuria/Freq.: No  Dysuria or Hematuria: No  Fatigue/Achiness: YES  Sick/Strep Exposure: No  Therapies tried and outcome: albuterol inhaler - mild help. Advil/tylenol - mild help        Review of Systems   Constitutional:  Positive for chills.   HENT:  Positive for ear pain, rhinorrhea and sore throat.    Respiratory:  Positive for cough, shortness of breath and wheezing.    Cardiovascular:  Positive for chest pain.   Neurological:  Positive for headaches.            Objective    /79 (BP Location: Left arm, Patient Position: Chair, Cuff Size: Adult Large)   Pulse (!) 129   Temp 99.4  F (37.4  C) (Oral)   Resp 14   Ht 1.829 m (6')   Wt 107.5 kg (237 lb)   SpO2 96%   BMI 32.14 kg/m    Body mass index is 32.14 kg/m .  Physical Exam   Head: Normocephalic, atraumatic.  Eyes: Conjunctiva clear, non icteric. PERRLA.  Ears: External ears nl, TM is nl   Nose: Septum midline, nasal mucosa congested. No discharge.  There is no tenderness over the maxillary sinuses, there is no tenderness over the frontal sinuses  Mouth / Throat: Normal dentition.  No oral lesions. Pharynx no erythematous, tonsils no exudate/hypertrophy.  Neck: Supple, no enlarged LN, trachea midline.  LUNGS:  CTA B/L, no wheezing or crackles.  CVS : RRR, no murmur, no rub, positive for tachycardia.

## 2024-01-04 ENCOUNTER — TELEPHONE (OUTPATIENT)
Dept: FAMILY MEDICINE | Facility: CLINIC | Age: 54
End: 2024-01-04
Payer: COMMERCIAL

## 2024-01-04 DIAGNOSIS — J40 BRONCHITIS: ICD-10-CM

## 2024-01-04 LAB
FLUAV RNA SPEC QL NAA+PROBE: NEGATIVE
FLUBV RNA RESP QL NAA+PROBE: POSITIVE
RSV RNA SPEC NAA+PROBE: NEGATIVE
SARS-COV-2 RNA RESP QL NAA+PROBE: NEGATIVE

## 2024-01-04 RX ORDER — BENZONATATE 200 MG/1
200 CAPSULE ORAL 3 TIMES DAILY PRN
Qty: 30 CAPSULE | Refills: 0 | Status: SHIPPED | OUTPATIENT
Start: 2024-01-04

## 2024-01-04 RX ORDER — ALBUTEROL SULFATE 90 UG/1
2 AEROSOL, METERED RESPIRATORY (INHALATION) EVERY 6 HOURS
Qty: 18 G | Refills: 0 | Status: SHIPPED | OUTPATIENT
Start: 2024-01-04

## 2024-01-04 NOTE — TELEPHONE ENCOUNTER
Reason for call:  Other   Patient called regarding (reason for call): prescription    Additional comments:   Resend New pharmacy [facility]   1 for coughing and 1 inhaler  Please keep patient updated, he will see your missed call.    Phone number to reach patient:  Cell number on file:    Telephone Information:   Mobile [phone]       Best Time:  any    Can we leave a detailed message on this number?  No but text is ok    Travel screening: Not Applicable

## 2024-01-04 NOTE — TELEPHONE ENCOUNTER
"See note, sent to new pharmacy, called pt, mail box full and cannot leave message, see note, \"pt will see missed our call\", will close and can addend if pt calls back  Irlanda Summers RN, BSN  [facility]    "

## 2024-01-05 ENCOUNTER — TELEPHONE (OUTPATIENT)
Dept: FAMILY MEDICINE | Facility: CLINIC | Age: 54
End: 2024-01-05
Payer: COMMERCIAL

## 2024-01-05 NOTE — TELEPHONE ENCOUNTER
Labs are positive for influenza, which explains this sever infection he got.  At this time, no treatment is necessary, usually treatment is recommended in the first 48 hours.  Let me know if he is not feeling any better. Consider flu vaccination in the future.  Camron Garcia MD  [facility].   [phone]

## 2024-01-05 NOTE — TELEPHONE ENCOUNTER
Called patient.  He did see below.  Advised of below.  He is almost better.  Nydia Davenport, RN    Annette Benedict  Your results are showing positive influenza, at this time, I recommend that you continue on cough medicine and over the counter meds to help with your symptoms, let me know if you're not any better by Monday.  Camron Garcia MD  [facility]  [phone]   Written by Camron Garcia MD on 1/5/2024  9:05 AM CST  Seen by patient Jak Villalpando on 1/5/2024  9:07 AM

## 2024-07-06 ENCOUNTER — HEALTH MAINTENANCE LETTER (OUTPATIENT)
Age: 54
End: 2024-07-06

## 2024-10-18 ENCOUNTER — MYC MEDICAL ADVICE (OUTPATIENT)
Dept: FAMILY MEDICINE | Facility: CLINIC | Age: 54
End: 2024-10-18
Payer: COMMERCIAL

## 2024-10-20 NOTE — TELEPHONE ENCOUNTER
Please call patient, can we do a physical?   If not, I'm happy to order Cologuard or Colonoscopy, whatever the patient wants  Camron Garcia MD  [facility].   [phone]

## 2024-12-10 ENCOUNTER — OFFICE VISIT (OUTPATIENT)
Dept: FAMILY MEDICINE | Facility: CLINIC | Age: 54
End: 2024-12-10
Payer: COMMERCIAL

## 2024-12-10 VITALS
SYSTOLIC BLOOD PRESSURE: 130 MMHG | HEIGHT: 72 IN | OXYGEN SATURATION: 97 % | HEART RATE: 80 BPM | TEMPERATURE: 98 F | BODY MASS INDEX: 32.37 KG/M2 | WEIGHT: 239 LBS | DIASTOLIC BLOOD PRESSURE: 85 MMHG | RESPIRATION RATE: 14 BRPM

## 2024-12-10 DIAGNOSIS — Z00.00 ROUTINE GENERAL MEDICAL EXAMINATION AT A HEALTH CARE FACILITY: Primary | ICD-10-CM

## 2024-12-10 DIAGNOSIS — Z12.11 SCREEN FOR COLON CANCER: ICD-10-CM

## 2024-12-10 DIAGNOSIS — Z12.5 SCREENING FOR PROSTATE CANCER: ICD-10-CM

## 2024-12-10 LAB
ALBUMIN SERPL BCG-MCNC: 4.5 G/DL (ref 3.5–5.2)
ALP SERPL-CCNC: 66 U/L (ref 40–150)
ALT SERPL W P-5'-P-CCNC: 18 U/L (ref 0–70)
ANION GAP SERPL CALCULATED.3IONS-SCNC: 11 MMOL/L (ref 7–15)
AST SERPL W P-5'-P-CCNC: 26 U/L (ref 0–45)
BILIRUB SERPL-MCNC: 0.4 MG/DL
BUN SERPL-MCNC: 13.6 MG/DL (ref 6–20)
CALCIUM SERPL-MCNC: 9.3 MG/DL (ref 8.8–10.4)
CHLORIDE SERPL-SCNC: 103 MMOL/L (ref 98–107)
CHOLEST SERPL-MCNC: 212 MG/DL
CREAT SERPL-MCNC: 1.02 MG/DL (ref 0.67–1.17)
EGFRCR SERPLBLD CKD-EPI 2021: 86 ML/MIN/1.73M2
EST. AVERAGE GLUCOSE BLD GHB EST-MCNC: 123 MG/DL
FASTING STATUS PATIENT QL REPORTED: NO
FASTING STATUS PATIENT QL REPORTED: NO
GLUCOSE SERPL-MCNC: 114 MG/DL (ref 70–99)
HBA1C MFR BLD: 5.9 % (ref 0–5.6)
HCO3 SERPL-SCNC: 27 MMOL/L (ref 22–29)
HDLC SERPL-MCNC: 49 MG/DL
LDLC SERPL CALC-MCNC: 138 MG/DL
NONHDLC SERPL-MCNC: 163 MG/DL
POTASSIUM SERPL-SCNC: 4.3 MMOL/L (ref 3.4–5.3)
PROT SERPL-MCNC: 6.7 G/DL (ref 6.4–8.3)
PSA SERPL DL<=0.01 NG/ML-MCNC: 0.62 NG/ML (ref 0–3.5)
SODIUM SERPL-SCNC: 141 MMOL/L (ref 135–145)
TRIGL SERPL-MCNC: 127 MG/DL

## 2024-12-10 PROCEDURE — 99396 PREV VISIT EST AGE 40-64: CPT | Performed by: PHYSICIAN ASSISTANT

## 2024-12-10 PROCEDURE — 80053 COMPREHEN METABOLIC PANEL: CPT | Performed by: PHYSICIAN ASSISTANT

## 2024-12-10 PROCEDURE — 80061 LIPID PANEL: CPT | Performed by: PHYSICIAN ASSISTANT

## 2024-12-10 PROCEDURE — G0103 PSA SCREENING: HCPCS | Performed by: PHYSICIAN ASSISTANT

## 2024-12-10 PROCEDURE — 83036 HEMOGLOBIN GLYCOSYLATED A1C: CPT | Performed by: PHYSICIAN ASSISTANT

## 2024-12-10 PROCEDURE — 36415 COLL VENOUS BLD VENIPUNCTURE: CPT | Performed by: PHYSICIAN ASSISTANT

## 2024-12-10 SDOH — HEALTH STABILITY: PHYSICAL HEALTH: ON AVERAGE, HOW MANY DAYS PER WEEK DO YOU ENGAGE IN MODERATE TO STRENUOUS EXERCISE (LIKE A BRISK WALK)?: 6 DAYS

## 2024-12-10 SDOH — HEALTH STABILITY: PHYSICAL HEALTH: ON AVERAGE, HOW MANY MINUTES DO YOU ENGAGE IN EXERCISE AT THIS LEVEL?: 150+ MIN

## 2024-12-10 ASSESSMENT — PAIN SCALES - GENERAL: PAINLEVEL_OUTOF10: NO PAIN (0)

## 2024-12-10 ASSESSMENT — SOCIAL DETERMINANTS OF HEALTH (SDOH): HOW OFTEN DO YOU GET TOGETHER WITH FRIENDS OR RELATIVES?: TWICE A WEEK

## 2024-12-10 NOTE — PATIENT INSTRUCTIONS
Patient Education   Preventive Care Advice   This is general advice given by our system to help you stay healthy. However, your care team may have specific advice just for you. Please talk to your care team about your preventive care needs.  Nutrition  Eat 5 or more servings of fruits and vegetables each day.  Try wheat bread, brown rice and whole grain pasta (instead of white bread, rice, and pasta).  Get enough calcium and vitamin D. Check the label on foods and aim for 100% of the RDA (recommended daily allowance).  Lifestyle  Exercise at least 150 minutes each week  (30 minutes a day, 5 days a week).  Do muscle strengthening activities 2 days a week. These help control your weight and prevent disease.  No smoking.  Wear sunscreen to prevent skin cancer.  Have a dental exam and cleaning every 6 months.  Yearly exams  See your health care team every year to talk about:  Any changes in your health.  Any medicines your care team has prescribed.  Preventive care, family planning, and ways to prevent chronic diseases.  Shots (vaccines)   HPV shots (up to age 26), if you've never had them before.  Hepatitis B shots (up to age 59), if you've never had them before.  COVID-19 shot: Get this shot when it's due.  Flu shot: Get a flu shot every year.  Tetanus shot: Get a tetanus shot every 10 years.  Pneumococcal, hepatitis A, and RSV shots: Ask your care team if you need these based on your risk.  Shingles shot (for age 50 and up)  General health tests  Diabetes screening:  Starting at age 35, Get screened for diabetes at least every 3 years.  If you are younger than age 35, ask your care team if you should be screened for diabetes.  Cholesterol test: At age 39, start having a cholesterol test every 5 years, or more often if advised.  Bone density scan (DEXA): At age 50, ask your care team if you should have this scan for osteoporosis (brittle bones).  Hepatitis C: Get tested at least once in your life.  STIs (sexually  transmitted infections)  Before age 24: Ask your care team if you should be screened for STIs.  After age 24: Get screened for STIs if you're at risk. You are at risk for STIs (including HIV) if:  You are sexually active with more than one person.  You don't use condoms every time.  You or a partner was diagnosed with a sexually transmitted infection.  If you are at risk for HIV, ask about PrEP medicine to prevent HIV.  Get tested for HIV at least once in your life, whether you are at risk for HIV or not.  Cancer screening tests  Cervical cancer screening: If you have a cervix, begin getting regular cervical cancer screening tests starting at age 21.  Breast cancer scan (mammogram): If you've ever had breasts, begin having regular mammograms starting at age 40. This is a scan to check for breast cancer.  Colon cancer screening: It is important to start screening for colon cancer at age 45.  Have a colonoscopy test every 10 years (or more often if you're at risk) Or, ask your provider about stool tests like a FIT test every year or Cologuard test every 3 years.  To learn more about your testing options, visit:   .  For help making a decision, visit:   https://bit.ly/hd59533.  Prostate cancer screening test: If you have a prostate, ask your care team if a prostate cancer screening test (PSA) at age 55 is right for you.  Lung cancer screening: If you are a current or former smoker ages 50 to 80, ask your care team if ongoing lung cancer screenings are right for you.  For informational purposes only. Not to replace the advice of your health care provider. Copyright   2023 [employer]. All rights reserved. Clinically reviewed by the [facility]. SeeYourImpact.org 018085 - REV 01/24.

## 2024-12-10 NOTE — PROGRESS NOTES
Preventive Care Visit  [facility] GLORIAMOYEIMY Rushing PA-C, Family Medicine  Dec 10, 2024      Assessment & Plan     Routine general medical examination at a health care facility  Reviewed personal and family history. Reviewed age appropriate screenings. Reviewed healthy BP and BMI ranges. Counseled on lifestyle modifications for optimal mental and physical health.  Discussed age-appropriate health maintanence. Recommended any needed vaccinations. Continue to focus on well balanced diet and exercise     - Lipid panel reflex to direct LDL Non-fasting; Future  - Comprehensive metabolic panel (BMP + Alb, Alk Phos, ALT, AST, Total. Bili, TP); Future  - Hemoglobin A1c; Future  - Lipid panel reflex to direct LDL Non-fasting  - Comprehensive metabolic panel (BMP + Alb, Alk Phos, ALT, AST, Total. Bili, TP)  - Hemoglobin A1c    Screen for colon cancer    - Colonoscopy Screening  Referral; Future    Screening for prostate cancer    - PSA, screen; Future  - PSA, screen    Patient has been advised of split billing requirements and indicates understanding: Yes        BMI  Estimated body mass index is 32.41 kg/m  as calculated from the following:    Height as of this encounter: 1.829 m (6').    Weight as of this encounter: 108.4 kg (239 lb).   Weight management plan: Discussed healthy diet and exercise guidelines    Counseling  Appropriate preventive services were addressed with this patient via screening, questionnaire, or discussion as appropriate for fall prevention, nutrition, physical activity, Tobacco-use cessation, social engagement, weight loss and cognition.  Checklist reviewing preventive services available has been given to the patient.  Reviewed patient's diet, addressing concerns and/or questions.   The patient was instructed to see the dentist every 6 months.           Sarah Benedict is a 54 year old, presenting for the following:  Physical (Pt is not fasting) and Referral  (Colonoscopy )        12/10/2024     6:49 AM   Additional Questions   Roomed by Elena VIEYRA MA   Accompanied by self         12/10/2024     6:49 AM   Patient Reported Additional Medications   Patient reports taking the following new medications none        HPI  Patient feeling well.  No concerns.  Some difficulty sleeping at night but takes Unisom which seems to help some.  Had a couple sips of coffee this AM (creamer) but hasn't eaten.        Health Care Directive  Patient does not have a Health Care Directive: Discussed advance care planning with patient; however, patient declined at this time.      12/10/2024   General Health   How would you rate your overall physical health? Good   Feel stress (tense, anxious, or unable to sleep) Patient declined            12/10/2024   Nutrition   Three or more servings of calcium each day? (!) NO   Diet: Regular (no restrictions)   How many servings of fruit and vegetables per day? (!) 2-3   How many sweetened beverages each day? 0-1            12/10/2024   Exercise   Days per week of moderate/strenous exercise 6 days   Average minutes spent exercising at this level 150+ min            12/10/2024   Social Factors   Frequency of gathering with friends or relatives Twice a week   Worry food won't last until get money to buy more No   Food not last or not have enough money for food? No   Do you have housing? (Housing is defined as stable permanent housing and does not include staying ouside in a car, in a tent, in an abandoned building, in an overnight shelter, or couch-surfing.) Yes   Are you worried about losing your housing? No   Lack of transportation? No   Unable to get utilities (heat,electricity)? No            12/10/2024   Fall Risk   Fallen 2 or more times in the past year? No    Trouble with walking or balance? No        Patient-reported          12/10/2024   Dental   Dentist two times every year? (!) NO            12/10/2024   TB Screening   Were you born outside of the [place]?  "No              Today's PHQ-2 Score:       1/3/2024     3:50 PM   PHQ-2 (  [employer])   Q1: Little interest or pleasure in doing things 0   Q2: Feeling down, depressed or hopeless 0   PHQ-2 Score 0         12/10/2024   Substance Use   Alcohol more than 3/day or more than 7/wk No   Do you use any other substances recreationally? (!) DECLINE        Social History     Tobacco Use    Smoking status: Former     Current packs/day: 0.00     Average packs/day: 1 pack/day for 25.0 years (25.0 ttl pk-yrs)     Types: Cigarettes     Start date: 1988     Quit date: 2013     Years since quittin.4    Smokeless tobacco: Former   Vaping Use    Vaping status: Never Used   Substance Use Topics    Alcohol use: Yes     Comment: 1 drink every couple of weeks    Drug use: No           12/10/2024   STI Screening   New sexual partner(s) since last STI/HIV test? No      Last PSA: No results found for: \"PSA\"  ASCVD Risk   The 10-year ASCVD risk score (Roz CARDOZA, et al., 2019) is: 7.7%    Values used to calculate the score:      Age: 54 years      Sex: Male      Is Non- : No      Diabetic: No      Tobacco smoker: No      Systolic Blood Pressure: 130 mmHg      Is BP treated: No      HDL Cholesterol: 37 mg/dL      Total Cholesterol: 192 mg/dL           Reviewed and updated as needed this visit by Provider                        Review of Systems  Constitutional, HEENT, cardiovascular, pulmonary, gi and gu systems are negative, except as otherwise noted.     Objective    Exam  /85 (BP Location: Right arm, Patient Position: Sitting, Cuff Size: Adult Large)   Pulse 80   Temp 98  F (36.7  C) (Oral)   Resp 14   Ht 1.829 m (6')   Wt 108.4 kg (239 lb)   SpO2 97%   BMI 32.41 kg/m     Estimated body mass index is 32.41 kg/m  as calculated from the following:    Height as of this encounter: 1.829 m (6').    Weight as of this encounter: 108.4 kg (239 lb).    Physical Exam  GENERAL: alert and no " distress  EYES: Eyes grossly normal to inspection, PERRL and conjunctivae and sclerae normal  HENT: ear canals and TM's normal, nose and mouth without ulcers or lesions  NECK: no adenopathy, no asymmetry, masses, or scars  RESP: lungs clear to auscultation - no rales, rhonchi or wheezes  CV: regular rate and rhythm, normal S1 S2, no S3 or S4, no murmur, click or rub, no peripheral edema  ABDOMEN: soft, nontender, no hepatosplenomegaly, no masses and bowel sounds normal  MS: no gross musculoskeletal defects noted, no edema  SKIN: no suspicious lesions or rashes  NEURO: Normal strength and tone, mentation intact and speech normal  PSYCH: mentation appears normal, affect normal/bright        Signed Electronically by: Bladimir Rushing PA-C

## 2025-01-15 ENCOUNTER — TELEPHONE (OUTPATIENT)
Dept: GASTROENTEROLOGY | Facility: CLINIC | Age: 55
End: 2025-01-15
Payer: COMMERCIAL

## 2025-01-15 NOTE — TELEPHONE ENCOUNTER
"Endoscopy Scheduling Screen    Have you had any respiratory illness or flu-like symptoms in the last 10 days?  No    What is your communication preference for Instructions and/or Bowel Prep?   MyChart    What insurance is in the chart?  Other:  [employer]    Ordering/Referring Provider: Сергей   (If ordering provider performs procedure, schedule with ordering provider unless otherwise instructed. )    BMI: Estimated body mass index is 32.41 kg/m  as calculated from the following:    Height as of 12/10/24: 1.829 m (6').    Weight as of 12/10/24: 108.4 kg (239 lb).     Sedation Ordered  moderate sedation.   If patient BMI > 50 do not schedule in [facility].    If patient BMI > 45 do not schedule at [facility].    Are you taking methadone or Suboxone?  NO, No RN review required.    Have you been diagnosed and are being treated for severe PTSD or severe anxiety?  NO, No RN review required.    Are you taking any prescription medications for pain 3 or more times per week?   NO, No RN review required.    Do you have a history of malignant hyperthermia?  No    (Females) Are you currently pregnant?        Have you been diagnosed or told you have pulmonary hypertension?   No    Do you have an LVAD?  No    Have you been told you have moderate to severe sleep apnea?  No.    Have you been told you have COPD, asthma, or any other lung disease?  No    Do you have any heart conditions?  No     Have you ever had or are you waiting for an organ transplant?  No. Continue scheduling, no site restrictions.    Have you had a stroke or transient ischemic attack (TIA aka \"mini stroke\" in the last 6 months?   No    Have you been diagnosed with or been told you have cirrhosis of the liver?   No.    Are you currently on dialysis?   No    Do you need assistance transferring?   No    BMI: Estimated body mass index is 32.41 kg/m  as calculated from the following:    Height as of 12/10/24: 1.829 m (6').    Weight as of 12/10/24: 108.4 kg (239 lb).     Is patients " BMI > 40 and scheduling location [facility]?  No    Do you take an injectable or oral medication for weight loss or diabetes (excluding insulin)?  No    Do you take the medication Naltrexone?  No    Do you take blood thinners?  No       Prep   Are you currently on dialysis or do you have chronic kidney disease?  No    Do you have a diagnosis of diabetes?  No-maybe Pre    Do you have a diagnosis of cystic fibrosis (CF)?  No    On a regular basis do you go 3 -5 days between bowel movements?  No    BMI > 40?  No    Preferred Pharmacy:    [id number] - [place], [place] - [address]  [id number] [facility] [address]  Phone: [phone] Fax: [phone]      Final Scheduling Details     Procedure scheduled  Colonoscopy    Surgeon:  Ssuu     Date of procedure:  2/17/25     Pre-OP / PAC:   No - Not required for this site.    Location  [facility] - Patient preference.    Sedation   Moderate Sedation - Per order.      Patient Reminders:   You will receive a call from a Nurse to review instructions and health history.  This assessment must be completed prior to your procedure.  Failure to complete the Nurse assessment may result in the procedure being cancelled.      On the day of your procedure, please designate an adult(s) who can drive you home stay with you for the next 24 hours. The medicines used in the exam will make you sleepy. You will not be able to drive.      You cannot take public transportation, ride share services, or non-medical taxi service without a responsible caregiver.  Medical transport services are allowed with the requirement that a responsible caregiver will receive you at your destination.  We require that drivers and caregivers are confirmed prior to your procedure.

## 2025-01-28 ENCOUNTER — TELEPHONE (OUTPATIENT)
Dept: GASTROENTEROLOGY | Facility: CLINIC | Age: 55
End: 2025-01-28
Payer: COMMERCIAL

## 2025-01-28 NOTE — TELEPHONE ENCOUNTER
Pre visit planning completed.      Procedure details:    Patient scheduled for Colonoscopy on 02.17.2025.     Arrival time: 0815. Procedure time 0900 Clarify time as original message had arrival of 0835    Facility location: [facility]; [address] [place] [address]. Check in location: Main entrance, door #1 on the North side of the building under roundabout awning. DO NOT GO TO SURGERY/ED ENTRANCE.     Sedation type: Conscious sedation     Pre op exam needed? No.    Indication for procedure: Screen for colon cancer       Chart review:     Electronic implanted devices? No    Recent diagnosis of diverticulitis within the last 6 weeks? No      Medication review:    Diabetic? No    Anticoagulants? No    Weight loss medication/injectable? No GLP-1 medication per patient's medication list. Nursing to verify with pre-assessment call.    Other medication HOLDING recommendations:  N/A      Prep for procedure:     Bowel prep recommendation: Standard Miralax.   Due to: standard bowel prep    Procedure information and instructions sent via [employer]         Katerin Wells RN  Endoscopy Procedure Pre Assessment   [phone] option 2

## 2025-01-29 NOTE — TELEPHONE ENCOUNTER
Pre assessment completed for upcoming procedure.   (Please see previous telephone encounter notes for complete details)          Procedure details:    Arrival time and facility location reviewed.    Pre op exam needed? No.    Designated  policy reviewed. Instructed to have someone stay 6  hours post procedure.       Medication review:    Medications reviewed. Please see supporting documentation below. Holding recommendations discussed (if applicable).       Prep for procedure:     Procedure prep instructions reviewed.        Any additional information needed:  N/A      Patient verbalized understanding and had no questions or concerns at this time.      Terrie Randhawa LPN  Endoscopy Procedure Pre Assessment   [phone] option 2

## 2025-02-17 ENCOUNTER — HOSPITAL ENCOUNTER (OUTPATIENT)
Facility: CLINIC | Age: 55
Discharge: HOME | End: 2025-02-17
Attending: INTERNAL MEDICINE | Admitting: INTERNAL MEDICINE
Payer: COMMERCIAL

## 2025-02-17 VITALS
RESPIRATION RATE: 12 BRPM | TEMPERATURE: 97.6 F | HEART RATE: 81 BPM | SYSTOLIC BLOOD PRESSURE: 118 MMHG | BODY MASS INDEX: 33.86 KG/M2 | WEIGHT: 250 LBS | OXYGEN SATURATION: 93 % | HEIGHT: 72 IN | DIASTOLIC BLOOD PRESSURE: 75 MMHG

## 2025-02-17 LAB — COLONOSCOPY: NORMAL

## 2025-02-17 PROCEDURE — 250N000011 HC RX IP 250 OP 636: Performed by: INTERNAL MEDICINE

## 2025-02-17 PROCEDURE — 250N000013 HC RX MED GY IP 250 OP 250 PS 637: Performed by: INTERNAL MEDICINE

## 2025-02-17 PROCEDURE — 999N000099 HC STATISTIC MODERATE SEDATION < 10 MIN: Performed by: INTERNAL MEDICINE

## 2025-02-17 PROCEDURE — 88305 TISSUE EXAM BY PATHOLOGIST: CPT | Mod: 26 | Performed by: PATHOLOGY

## 2025-02-17 PROCEDURE — 45385 COLONOSCOPY W/LESION REMOVAL: CPT | Mod: PT | Performed by: INTERNAL MEDICINE

## 2025-02-17 PROCEDURE — 88305 TISSUE EXAM BY PATHOLOGIST: CPT | Mod: TC | Performed by: INTERNAL MEDICINE

## 2025-02-17 RX ORDER — FENTANYL CITRATE 50 UG/ML
25-100 INJECTION, SOLUTION INTRAMUSCULAR; INTRAVENOUS EVERY 5 MIN PRN
Status: DISCONTINUED | OUTPATIENT
Start: 2025-02-17 | End: 2025-02-17 | Stop reason: HOSPADM

## 2025-02-17 RX ORDER — SIMETHICONE 40MG/0.6ML
133 SUSPENSION, DROPS(FINAL DOSAGE FORM)(ML) ORAL
Status: COMPLETED | OUTPATIENT
Start: 2025-02-17 | End: 2025-02-17

## 2025-02-17 RX ORDER — ONDANSETRON 2 MG/ML
4 INJECTION INTRAMUSCULAR; INTRAVENOUS EVERY 6 HOURS PRN
Status: DISCONTINUED | OUTPATIENT
Start: 2025-02-17 | End: 2025-02-17 | Stop reason: HOSPADM

## 2025-02-17 RX ORDER — NALOXONE HYDROCHLORIDE 0.4 MG/ML
0.2 INJECTION, SOLUTION INTRAMUSCULAR; INTRAVENOUS; SUBCUTANEOUS
Status: DISCONTINUED | OUTPATIENT
Start: 2025-02-17 | End: 2025-02-17 | Stop reason: HOSPADM

## 2025-02-17 RX ORDER — PROCHLORPERAZINE MALEATE 10 MG
10 TABLET ORAL EVERY 6 HOURS PRN
Status: DISCONTINUED | OUTPATIENT
Start: 2025-02-17 | End: 2025-02-17 | Stop reason: HOSPADM

## 2025-02-17 RX ORDER — NALOXONE HYDROCHLORIDE 0.4 MG/ML
0.4 INJECTION, SOLUTION INTRAMUSCULAR; INTRAVENOUS; SUBCUTANEOUS
Status: DISCONTINUED | OUTPATIENT
Start: 2025-02-17 | End: 2025-02-17 | Stop reason: HOSPADM

## 2025-02-17 RX ORDER — EPINEPHRINE 1 MG/ML
0.1 INJECTION, SOLUTION, CONCENTRATE INTRAVENOUS
Status: DISCONTINUED | OUTPATIENT
Start: 2025-02-17 | End: 2025-02-17 | Stop reason: HOSPADM

## 2025-02-17 RX ORDER — LIDOCAINE 40 MG/G
CREAM TOPICAL
Status: DISCONTINUED | OUTPATIENT
Start: 2025-02-17 | End: 2025-02-17 | Stop reason: HOSPADM

## 2025-02-17 RX ORDER — ATROPINE SULFATE 0.1 MG/ML
1 INJECTION INTRAVENOUS
Status: DISCONTINUED | OUTPATIENT
Start: 2025-02-17 | End: 2025-02-17 | Stop reason: HOSPADM

## 2025-02-17 RX ORDER — ONDANSETRON 4 MG/1
4 TABLET, ORALLY DISINTEGRATING ORAL EVERY 6 HOURS PRN
Status: DISCONTINUED | OUTPATIENT
Start: 2025-02-17 | End: 2025-02-17 | Stop reason: HOSPADM

## 2025-02-17 RX ORDER — ONDANSETRON 2 MG/ML
4 INJECTION INTRAMUSCULAR; INTRAVENOUS
Status: DISCONTINUED | OUTPATIENT
Start: 2025-02-17 | End: 2025-02-17 | Stop reason: HOSPADM

## 2025-02-17 RX ORDER — FLUMAZENIL 0.1 MG/ML
0.2 INJECTION, SOLUTION INTRAVENOUS
Status: DISCONTINUED | OUTPATIENT
Start: 2025-02-17 | End: 2025-02-17 | Stop reason: HOSPADM

## 2025-02-17 RX ORDER — DIPHENHYDRAMINE HYDROCHLORIDE 50 MG/ML
25-50 INJECTION INTRAMUSCULAR; INTRAVENOUS
Status: DISCONTINUED | OUTPATIENT
Start: 2025-02-17 | End: 2025-02-17 | Stop reason: HOSPADM

## 2025-02-17 RX ADMIN — SIMETHICONE 133 MG: 20 SUSPENSION/ DROPS ORAL at 09:00

## 2025-02-17 RX ADMIN — MIDAZOLAM 2 MG: 1 INJECTION INTRAMUSCULAR; INTRAVENOUS at 09:01

## 2025-02-17 RX ADMIN — MIDAZOLAM 2 MG: 1 INJECTION INTRAMUSCULAR; INTRAVENOUS at 09:00

## 2025-02-17 RX ADMIN — FENTANYL CITRATE 100 MCG: 50 INJECTION, SOLUTION INTRAMUSCULAR; INTRAVENOUS at 09:00

## 2025-02-17 ASSESSMENT — ACTIVITIES OF DAILY LIVING (ADL)
ADLS_ACUITY_SCORE: 41
ADLS_ACUITY_SCORE: 41

## 2025-02-17 NOTE — H&P
[facility]  Pre-Endoscopy History and Physical     Jak Villalpando MRN# [id number]   YOB: 1970 Age: 54 year old     Date of Procedure: 2/17/2025  Primary care provider: Heide Maciel  Type of Endoscopy: colonoscopy  Reason for Procedure: screening  Type of Anesthesia Anticipated: Moderate Sedation    HPI:    Jak is a 54 year old male who will be undergoing the above procedure.      A history and physical has been performed. The patient's medications and allergies have been reviewed. The risks and benefits of the procedure and the sedation options and risks were discussed with the patient.  All questions were answered and informed consent was obtained.      He denies a personal or family history of anesthesia complications or bleeding disorders.     Allergies   Allergen Reactions    Ibuprofen Sodium Hives     ibuprofen        Prior to Admission Medications   Prescriptions Last Dose Informant Patient Reported? Taking?   albuterol (PROAIR HFA/PROVENTIL HFA/VENTOLIN HFA) 108 (90 Base) MCG/ACT inhaler   No No   Sig: Inhale 2 puffs into the lungs every 6 hours      Facility-Administered Medications: None       Patient Active Problem List   Diagnosis    Migraine headache    CTS (carpal tunnel syndrome)    Smoker    Upper back pain    Rib pain    Pain in thoracic spine    Compression fracture of thoracic vertebra (H)    Calculus of kidney (RENAL)    Postoperative pain    Gastroesophageal reflux disease without esophagitis        Past Medical History:   Diagnosis Date    Migraines     Renal disease     kidney stone        Past Surgical History:   Procedure Laterality Date    CYSTOSCOPY FLEXIBLE  7/2/2014    Procedure: CYSTOSCOPY FLEXIBLE;  Surgeon: Bharath Reyez MD;  Location:  OR    EXTRACORPOREAL SHOCK WAVE LITHOTRIPSY (ESWL)  7/2/2014    Procedure: EXTRACORPOREAL SHOCK WAVE LITHOTRIPSY (ESWL);  Surgeon: Bharath Reyez MD;  Location:  OR    EXTRACORPOREAL SHOCK WAVE  LITHOTRIPSY (ESWL) Bilateral 2017    Procedure: EXTRACORPOREAL SHOCK WAVE LITHOTRIPSY (ESWL);   BILATERAL EXTRACORPOREAL SHOCK WAVE LITHOTRIPSY ;  Surgeon: Bharath Reyez MD;  Location: [facility] OR    HERNIORRHAPHY INGUINAL  2014    Procedure: HERNIORRHAPHY INGUINAL;  Surgeon: Kush Weber MD;  Location: [facility] OR       Social History     Tobacco Use    Smoking status: Former     Current packs/day: 0.00     Average packs/day: 1 pack/day for 25.0 years (25.0 ttl pk-yrs)     Types: Cigarettes     Start date: 1988     Quit date: 2013     Years since quittin.6    Smokeless tobacco: Former   Substance Use Topics    Alcohol use: Yes     Comment: 1 drink every couple of weeks       Family History   Problem Relation Age of Onset    EYE* Mother         Eye pressure    Other - See Comments Father         brain tumor     Cancer Father     Cerebrovascular Disease Maternal Grandmother     Diabetes Maternal Grandmother     Hypertension Maternal Grandmother     Arthritis Maternal Grandmother     Cancer Maternal Grandfather         Brain Tumor    Arthritis Maternal Grandfather     Cerebrovascular Disease Paternal Grandmother     Diabetes Paternal Grandmother     Eczema Son     Other - See Comments Other         varicose veins  many in family     Colon Cancer No family hx of        REVIEW OF SYSTEMS:     5 point ROS negative except as noted above in HPI, including Gen., Resp., CV, GI &  system review.      PHYSICAL EXAM:   /75   Pulse 79   Temp 97.6  F (36.4  C) (Temporal)   Resp 23   Ht 1.829 m (6')   Wt 113.4 kg (250 lb)   SpO2 96%   BMI 33.91 kg/m   Estimated body mass index is 33.91 kg/m  as calculated from the following:    Height as of this encounter: 1.829 m (6').    Weight as of this encounter: 113.4 kg (250 lb).   GENERAL APPEARANCE: healthy, alert, and no distress  MENTAL STATUS: alert  AIRWAY EXAM: Mallampatti Class II (visualization of the soft palate, fauces, and uvula)  RESP: lungs  clear to auscultation - no rales, rhonchi or wheezes  CV: regular rates and rhythm      DIAGNOSTICS:    Not indicated      IMPRESSION   ASA Class 2 - Mild systemic disease        PLAN:       Plan for colonoscopy. We discussed the risks, benefits and alternatives and the patient wished to proceed.    The above has been forwarded to the consulting provider.      Signed Electronically by: Tacos Cristobal MD  February 17, 2025    Tacos Cristobal MD  [facility]  Cell: [phone]  Office Phone: [phone]  Office Fax: [phone]

## 2025-02-18 LAB
PATH REPORT.COMMENTS IMP SPEC: NORMAL
PATH REPORT.COMMENTS IMP SPEC: NORMAL
PATH REPORT.FINAL DX SPEC: NORMAL
PATH REPORT.GROSS SPEC: NORMAL
PATH REPORT.MICROSCOPIC SPEC OTHER STN: NORMAL
PATH REPORT.RELEVANT HX SPEC: NORMAL
PHOTO IMAGE: NORMAL

## 2025-03-03 ENCOUNTER — PATIENT OUTREACH (OUTPATIENT)
Dept: GASTROENTEROLOGY | Facility: CLINIC | Age: 55
End: 2025-03-03
Payer: COMMERCIAL

## 2025-03-03 PROBLEM — D12.6 ADENOMATOUS COLON POLYP: Status: ACTIVE | Noted: 2025-03-03

## (undated) DEVICE — PACK CYSTOSCOPY SBA15CYFSI

## (undated) DEVICE — RAD RX ISOVUE 300 (50ML) 61% IOPAMIDOL CHARGE PER ML

## (undated) DEVICE — QUICK TRAP

## (undated) DEVICE — BAG CYSTO TABLE DRAIN

## (undated) DEVICE — ENDO SNARE EXACTO COLD 9MM LOOP 2.4MMX230CM 00711115

## (undated) DEVICE — WIRE GUIDE NITINOL 1.2MX34CM

## (undated) DEVICE — GLOVE PROTEXIS W/NEU-THERA 7.5  2D73TE75

## (undated) DEVICE — CATH URETERAL OPEN END 6FR AXXCESS

## (undated) DEVICE — PAD CHUX UNDERPAD 23X24" 7136

## (undated) DEVICE — SOL WATER IRRIG 3000ML BAG 2B7117

## (undated) RX ORDER — DEXAMETHASONE SODIUM PHOSPHATE 4 MG/ML
INJECTION, SOLUTION INTRA-ARTICULAR; INTRALESIONAL; INTRAMUSCULAR; INTRAVENOUS; SOFT TISSUE
Status: DISPENSED
Start: 2017-09-20

## (undated) RX ORDER — FENTANYL CITRATE 50 UG/ML
INJECTION, SOLUTION INTRAMUSCULAR; INTRAVENOUS
Status: DISPENSED
Start: 2017-09-20

## (undated) RX ORDER — HYDROMORPHONE HYDROCHLORIDE 1 MG/ML
INJECTION, SOLUTION INTRAMUSCULAR; INTRAVENOUS; SUBCUTANEOUS
Status: DISPENSED
Start: 2017-09-20

## (undated) RX ORDER — FENTANYL CITRATE 50 UG/ML
INJECTION, SOLUTION INTRAMUSCULAR; INTRAVENOUS
Status: DISPENSED
Start: 2025-02-17

## (undated) RX ORDER — PROPOFOL 10 MG/ML
INJECTION, EMULSION INTRAVENOUS
Status: DISPENSED
Start: 2017-09-20

## (undated) RX ORDER — MEPERIDINE HYDROCHLORIDE 25 MG/ML
INJECTION INTRAMUSCULAR; INTRAVENOUS; SUBCUTANEOUS
Status: DISPENSED
Start: 2017-09-20

## (undated) RX ORDER — LIDOCAINE HYDROCHLORIDE 20 MG/ML
INJECTION, SOLUTION EPIDURAL; INFILTRATION; INTRACAUDAL; PERINEURAL
Status: DISPENSED
Start: 2017-09-20

## (undated) RX ORDER — SIMETHICONE 40MG/0.6ML
SUSPENSION, DROPS(FINAL DOSAGE FORM)(ML) ORAL
Status: DISPENSED
Start: 2025-02-17

## (undated) RX ORDER — KETOROLAC TROMETHAMINE 30 MG/ML
INJECTION, SOLUTION INTRAMUSCULAR; INTRAVENOUS
Status: DISPENSED
Start: 2017-09-20

## (undated) RX ORDER — ONDANSETRON 2 MG/ML
INJECTION INTRAMUSCULAR; INTRAVENOUS
Status: DISPENSED
Start: 2017-09-20

## (undated) RX ORDER — OXYCODONE AND ACETAMINOPHEN 5; 325 MG/1; MG/1
TABLET ORAL
Status: DISPENSED
Start: 2017-09-20

## (undated) RX ORDER — CEFAZOLIN SODIUM 2 G/100ML
INJECTION, SOLUTION INTRAVENOUS
Status: DISPENSED
Start: 2017-09-20